# Patient Record
Sex: MALE | Race: WHITE | Employment: OTHER | URBAN - METROPOLITAN AREA
[De-identification: names, ages, dates, MRNs, and addresses within clinical notes are randomized per-mention and may not be internally consistent; named-entity substitution may affect disease eponyms.]

---

## 2020-07-18 ENCOUNTER — HOSPITAL ENCOUNTER (OUTPATIENT)
Dept: LAB | Age: 60
Discharge: HOME | End: 2020-07-18
Payer: OTHER GOVERNMENT

## 2020-07-18 DIAGNOSIS — U07.1 ASYMPTOMATIC COVID-19 VIRUS INFECTION: ICD-10-CM

## 2020-07-18 PROCEDURE — 87635 SARS-COV-2 COVID-19 AMP PRB: CPT

## 2020-07-20 LAB — SARS-COV-2, COV2NT: NOT DETECTED

## 2020-09-21 ENCOUNTER — HOSPITAL ENCOUNTER (OUTPATIENT)
Dept: LAB | Age: 60
Discharge: HOME | End: 2020-09-21
Payer: OTHER GOVERNMENT

## 2020-09-21 DIAGNOSIS — Z01.812 PRE-PROCEDURAL LABORATORY EXAMINATIONS: ICD-10-CM

## 2020-09-21 PROCEDURE — 87635 SARS-COV-2 COVID-19 AMP PRB: CPT

## 2020-09-22 LAB — SARS-COV-2, COV2NT: NOT DETECTED

## 2020-09-22 RX ORDER — LISINOPRIL 40 MG/1
40 TABLET ORAL DAILY
COMMUNITY
End: 2022-05-29

## 2020-09-22 RX ORDER — TRIAMTERENE AND HYDROCHLOROTHIAZIDE 75; 50 MG/1; MG/1
1 TABLET ORAL DAILY
COMMUNITY
End: 2022-05-29

## 2020-09-22 RX ORDER — ASPIRIN 81 MG/1
81 TABLET ORAL DAILY
COMMUNITY

## 2020-09-22 NOTE — PROGRESS NOTES
[address] [place] [address] Dr Orozco Preprocedure Instructions      1. On the day of your surgery, please report to registration located on the 2nd floor of the  [facility]. yes    2. You must have a responsible adult to drive you to the hospital, stay at the hospital during your procedure and drive you home. If they leave your procedure will not be started (no exceptions). yes    3. Do not have anything to eat or drink (including water, gum, mints, coffee, and juice) after midnight. This does not apply to the medications you were instructed to take by your physician. yesIf you are currently taking Plavix, Coumadin, Aspirin, or other blood-thinning agents, contact your physician for special instructions. Yes,81mg ASA    4. If you are having a procedure that requires bowel prep: We recommend that you have only clear liquids the day before your procedure and begin your bowel prep by 5:00 pm.  You may continue to drink clear liquids until midnight. If for any reason you are not able to complete your prep please notify your physician immediately. yes    5. Have a list of all current medications, including vitamins, herbal supplements and any other over the counter medications. yes    6. If you wear glasses, contacts, dentures and/or hearing aids, they may be removed prior to procedure, please bring a case to store them in. yes    7. You should understand that if you do not follow these instructions your procedure may be cancelled. If your physical condition changes (I.e. fever, cold or flu) please contact your doctor as soon as possible. 8. It is important that you be on time.   If for any reason you are unable to keep your appointment please call )- the day of or your physicians office prior to your scheduled procedure

## 2020-09-25 ENCOUNTER — ANESTHESIA EVENT (OUTPATIENT)
Dept: ENDOSCOPY | Age: 60
End: 2020-09-25
Payer: OTHER GOVERNMENT

## 2020-09-25 ENCOUNTER — HOSPITAL ENCOUNTER (OUTPATIENT)
Age: 60
Setting detail: OUTPATIENT SURGERY
Discharge: HOME | End: 2020-09-25
Attending: SPECIALIST | Admitting: SPECIALIST
Payer: OTHER GOVERNMENT

## 2020-09-25 ENCOUNTER — ANESTHESIA (OUTPATIENT)
Dept: ENDOSCOPY | Age: 60
End: 2020-09-25
Payer: OTHER GOVERNMENT

## 2020-09-25 VITALS
BODY MASS INDEX: 35.34 KG/M2 | DIASTOLIC BLOOD PRESSURE: 84 MMHG | HEIGHT: 71 IN | OXYGEN SATURATION: 97 % | RESPIRATION RATE: 17 BRPM | HEART RATE: 83 BPM | WEIGHT: 252.43 LBS | TEMPERATURE: 98.1 F | SYSTOLIC BLOOD PRESSURE: 117 MMHG

## 2020-09-25 PROCEDURE — 76040000019: Performed by: SPECIALIST

## 2020-09-25 PROCEDURE — 74011250636 HC RX REV CODE- 250/636: Performed by: NURSE ANESTHETIST, CERTIFIED REGISTERED

## 2020-09-25 PROCEDURE — 88305 TISSUE EXAM BY PATHOLOGIST: CPT

## 2020-09-25 PROCEDURE — 76060000031 HC ANESTHESIA FIRST 0.5 HR: Performed by: SPECIALIST

## 2020-09-25 PROCEDURE — 2709999900 HC NON-CHARGEABLE SUPPLY: Performed by: SPECIALIST

## 2020-09-25 PROCEDURE — 77030013992 HC SNR POLYP ENDOSC BSC -B: Performed by: SPECIALIST

## 2020-09-25 RX ORDER — FENTANYL CITRATE 50 UG/ML
25 INJECTION, SOLUTION INTRAMUSCULAR; INTRAVENOUS AS NEEDED
Status: DISCONTINUED | OUTPATIENT
Start: 2020-09-25 | End: 2020-09-25 | Stop reason: HOSPADM

## 2020-09-25 RX ORDER — FLUMAZENIL 0.1 MG/ML
0.2 INJECTION INTRAVENOUS
Status: DISCONTINUED | OUTPATIENT
Start: 2020-09-25 | End: 2020-09-25 | Stop reason: HOSPADM

## 2020-09-25 RX ORDER — PROPOFOL 10 MG/ML
INJECTION, EMULSION INTRAVENOUS
Status: DISCONTINUED | OUTPATIENT
Start: 2020-09-25 | End: 2020-09-25 | Stop reason: HOSPADM

## 2020-09-25 RX ORDER — DEXTROMETHORPHAN/PSEUDOEPHED 2.5-7.5/.8
1.2 DROPS ORAL
Status: DISCONTINUED | OUTPATIENT
Start: 2020-09-25 | End: 2020-09-25 | Stop reason: HOSPADM

## 2020-09-25 RX ORDER — NALOXONE HYDROCHLORIDE 0.4 MG/ML
0.4 INJECTION, SOLUTION INTRAMUSCULAR; INTRAVENOUS; SUBCUTANEOUS
Status: DISCONTINUED | OUTPATIENT
Start: 2020-09-25 | End: 2020-09-25 | Stop reason: HOSPADM

## 2020-09-25 RX ORDER — MIDAZOLAM HYDROCHLORIDE 1 MG/ML
.25-5 INJECTION, SOLUTION INTRAMUSCULAR; INTRAVENOUS AS NEEDED
Status: DISCONTINUED | OUTPATIENT
Start: 2020-09-25 | End: 2020-09-25 | Stop reason: HOSPADM

## 2020-09-25 RX ORDER — SODIUM CHLORIDE 9 MG/ML
INJECTION, SOLUTION INTRAVENOUS
Status: DISCONTINUED | OUTPATIENT
Start: 2020-09-25 | End: 2020-09-25 | Stop reason: HOSPADM

## 2020-09-25 RX ORDER — SODIUM CHLORIDE 9 MG/ML
50 INJECTION, SOLUTION INTRAVENOUS CONTINUOUS
Status: DISCONTINUED | OUTPATIENT
Start: 2020-09-25 | End: 2020-09-25 | Stop reason: HOSPADM

## 2020-09-25 RX ADMIN — SODIUM CHLORIDE: 9 INJECTION, SOLUTION INTRAVENOUS at 07:36

## 2020-09-25 RX ADMIN — PROPOFOL 200 MCG/KG/MIN: 10 INJECTION, EMULSION INTRAVENOUS at 07:50

## 2020-09-25 NOTE — H&P
63 y.o. male for open access colonoscopy for screening   Additional data for completion of the targeted pre-endoscopy H&P will be provided under 'H&P interval notes'. Please see that document which will be attached to this.   Cassandra Johnson MD

## 2020-09-25 NOTE — ROUTINE PROCESS
Perfecto López  1960  [id number]    Situation:  Verbal report received from: Rutherford Opitz  Procedure: Procedure(s):  COLONOSCOPY  ENDOSCOPIC POLYPECTOMY    Background:    Preoperative diagnosis: FAMILY HISTORY COLON CANCER, PERSONAL HISTORY COLON POLYPS  Postoperative diagnosis: Ascending colon polyp  Rectal polyp  descending polyp    :  Dr. Melissa Arauz  Assistant(s): Endoscopy Technician-1: Ej Nunez  Endoscopy RN-1: Hermelinda Platt RN    Specimens:   ID Type Source Tests Collected by Time Destination   1 : polyp Preservative Colon, Ascending  Merary Faye MD 9/25/2020 3418 Pathology   2 : polyp x2 Preservative Colon, Descending  Merary Faye MD 9/25/2020 0585 Pathology   3 : polyp Preservative Rectum  Merary Faye MD 9/25/2020 1346 Pathology     H. Pylori  no    Assessment:  Intra-procedure medications     Anesthesia gave intra-procedure sedation and medications, see anesthesia flow sheet yes    Intravenous fluids: NS@ KVO     Vital signs stable     Abdominal assessment: round and soft     Recommendation:  Discharge patient per MD order.   Family wife  Permission to share finding with family or friend yes

## 2020-09-25 NOTE — ROUTINE PROCESS
Endoscopy discharge instructions have been reviewed and given to patient. The patient verbalized understanding and acceptance of instructions. Dr. Frank Medina discussed with spouse procedure findings and next steps.

## 2020-09-25 NOTE — ANESTHESIA PREPROCEDURE EVALUATION
Relevant Problems   No relevant active problems       Anesthetic History   No history of anesthetic complications            Review of Systems / Medical History  Patient summary reviewed, nursing notes reviewed and pertinent labs reviewed    Pulmonary  Within defined limits            Pertinent negatives: No asthma and recent URI     Neuro/Psych   Within defined limits           Cardiovascular    Hypertension: well controlled              Exercise tolerance: >4 METS     GI/Hepatic/Renal  Within defined limits              Endo/Other  Within defined limits           Other Findings              Physical Exam    Airway  Mallampati: II  TM Distance: 4 - 6 cm  Neck ROM: normal range of motion   Mouth opening: Diminished (comment)     Cardiovascular  Regular rate and rhythm,  S1 and S2 normal,  no murmur, click, rub, or gallop             Dental    Dentition: Lower dentition intact and Upper dentition intact     Pulmonary  Breath sounds clear to auscultation               Abdominal         Other Findings            Anesthetic Plan    ASA: 2  Anesthesia type: MAC            Anesthetic plan and risks discussed with: Patient

## 2020-09-25 NOTE — ANESTHESIA POSTPROCEDURE EVALUATION
Procedure(s):  COLONOSCOPY  ENDOSCOPIC POLYPECTOMY.     MAC    Anesthesia Post Evaluation      Multimodal analgesia: multimodal analgesia used between 6 hours prior to anesthesia start to PACU discharge  Patient location during evaluation: PACU  Patient participation: complete - patient participated  Level of consciousness: awake and alert  Pain management: adequate  Airway patency: patent  Anesthetic complications: no  Cardiovascular status: acceptable  Respiratory status: acceptable  Hydration status: acceptable  Post anesthesia nausea and vomiting:  none      INITIAL Post-op Vital signs:   Vitals Value Taken Time   /77 9/25/2020  8:10 AM   Temp     Pulse 83 9/25/2020  8:10 AM   Resp 16 9/25/2020  8:10 AM   SpO2 96 % 9/25/2020  8:10 AM

## 2020-09-25 NOTE — DISCHARGE INSTRUCTIONS
[address] [facility] DARRION Maldonado MD  [phone]      September 25, 2020    Shaunna Cornelius  YOB: 1960    COLONOSCOPY DISCHARGE INSTRUCTIONS    If there is redness at IV site you should apply warm compress to area. If redness or soreness persist contact Dr. Michael Maldonado' or your primary care doctor. There may be a slight amount of blood passed from the rectum. Gaseous discomfort may develop, but walking, belching will help relieve this. You may not operate a vehicle for 12 hours  You may not operate machinery or dangerous appliances for rest of today  You may not drink alcoholic beverages for 12 hours  Avoid making any critical decisions for 24 hours    DIET:  You may resume your normal diet, but some patients find that heavy or large meals may lead to indigestion or vomiting. I suggest a light meal as first food intake. MEDICATIONS:  The use of some over-the-counter pain medication may lead to bleeding after colon biopsies or polyp removal.  Tylenol (also called acetaminophen) is safe to take even if you have had colonoscopy with polyp removal.  Based on the procedure you had today you may not safely take aspirin or aspirin-like products for the next ten (10) days. Remember that Tylenol (also called acetaminophen) is safe to take after colonoscopy even if you have had biopsies or polyps removed. ACTIVITY:  You may resume your normal household activities, but it is recommended that you spend the remainder of the day resting -  avoid any strenuous activity. CALL DR. Matheus Ayoub' OFFICE IF:  Increasing pain, nausea, vomiting  Abdominal distension (swelling)  Significant new or increased bleeding (oral or rectal)  Fever/Chills  Chest pain/shortness of breath                       Additional instructions: We found four small polyps and removed them all.   I will contact you with the polyp results by letter in about a week.  No aspirin 10 days to minimize risk for bleeding. It was an honor to be your doctor today. Please let me or my office staff know if you have any feedback about today's procedure. Cassandra Johnson MD    Colonoscopy saves lives, and can prevent colon cancer. Everyone aged 48 or older needs colonoscopy.   Tell your family and friends: get the test!

## 2020-09-25 NOTE — PROCEDURES
[address] [facility] DARRION Christiansen MD  [phone]      2020    Colonoscopy Procedure Note  Kavitha Garduno  :  1960  [facility] Medical Record Number: [id number]    Indications:     Family history of coloretal cancer (screening only), Personal history of colon polyps (screening only), Screening colonoscopy  PCP:  Og, MD Elieser  Anesthesia/Sedation: Conscious Sedation/Moderate Sedation/GETA, see notes  Endoscopist:  Dr. Marco Antonio Duarte  Complications:  None  Estimated Blood Loss:  None    Permit:  The indications, risks, benefits and alternatives were reviewed with the patient or their decision maker who was provided an opportunity to ask questions and all questions were answered. The specific risks of colonoscopy with conscious sedation were reviewed, including but not limited to anesthetic complication, bleeding, adverse drug reaction, missed lesion, infection, IV site reactions, and intestinal perforation which would lead to the need for surgical repair. Alternatives to colonoscopy including radiographic imaging, observation without testing, or laboratory testing were reviewed including the limitations of those alternatives. After considering the options and having all their questions answered, the patient or their decision maker provided both verbal and written consent to proceed. Procedure in Detail:  After obtaining informed consent, positioning of the patient in the left lateral decubitus position, and conduction of a pre-procedure pause or \"time out\" the endoscope was introduced into the anus and advanced to the cecum, which was identified by the ileocecal valve and appendiceal orifice. The quality of the colonic preparation was good. A careful inspection was made as the colonoscope was withdrawn, findings and interventions are described below.     Findings:   Four polyps today, each taken with cold snare, retrieved and hemostasis confirmed. Ascending 4mm  Descending 6mm, 4mm  Rectum 4mm        Specimens:    See above    Complications:   None; patient tolerated the procedure well. Impression:  Colon polyps four. Recommendations:     - Await pathology. Thank you for entrusting me with this patient's care. Please do not hesitate to contact me with any questions or if I can be of assistance with any of your other patients' GI needs. Signed By: Tess Ordaz MD                        September 25, 2020      Surgical assistant none. Implants none unless specified.

## 2020-09-25 NOTE — PERIOP NOTES
Received report from [address] [place] [address], see anesthesia note. Scopes all washed at bedside by Ric Del Cid. Pt transferred to recovery and report given to [facility] regarding procedures, diagnosis, etc. Family updated by phone on POC by Dr. Percy Gowers. VSS, abdoman soft.

## 2020-09-25 NOTE — INTERVAL H&P NOTE
Pre-Endoscopy H&P Update  Chief complaint/HPI/ROS:  The indication for the procedure, the patient's history and the patient's current medications are reviewed prior to the procedure and that data is reported on the H&P to which this document is attached. Any significant complaints with regard to organ systems will be noted, and if not mentioned then a review of systems is not contributory. Past Medical History:   Diagnosis Date    Hypertension       Past Surgical History:   Procedure Laterality Date    HX ORTHOPAEDIC      lower back surgery     Social   Social History     Tobacco Use    Smoking status: Former Smoker    Smokeless tobacco: Never Used   Substance Use Topics    Alcohol use: Yes     Alcohol/week: 6.0 - 7.0 standard drinks     Types: 6 - 7 Glasses of wine per week      Family History   Problem Relation Age of Onset    Stroke Mother     Cancer Father       No Known Allergies   Prior to Admission Medications   Prescriptions Last Dose Informant Patient Reported? Taking?   aspirin delayed-release 81 mg tablet 9/18/2020 at Unknown time  Yes Yes   Sig: Take 81 mg by mouth daily. lisinopriL (PRINIVIL, ZESTRIL) 40 mg tablet 9/25/2020 at Unknown time  Yes Yes   Sig: Take 40 mg by mouth daily. triamterene-hydroCHLOROthiazide (MAXZIDE) 75-50 mg per tablet 9/24/2020 at Unknown time  Yes Yes   Sig: Take 1 Tab by mouth daily. Facility-Administered Medications: None       PHYSICAL EXAM:  The patient is examined immediately prior to the procedure. Visit Vitals  BP (!) 149/86   Pulse 79   Temp 98.6 °F (37 °C)   Resp 16   Ht 5' 11\" (1.803 m)   Wt 114.5 kg (252 lb 6.8 oz)   SpO2 99%   BMI 35.21 kg/m²     Gen: Appears comfortable, no distress. Pulm: comfortable respirations with no abnormal audible breath sounds  HEART: well perfused, no abnormal audible heart sounds  GI: abdomen flat. PLAN:  Informed consent discussion held, patient afforded an opportunity to ask questions and all questions answered. After being advised of the risks, benefits, and alternatives, the patient requested that we proceed and indicated so on a written consent form. Will proceed with procedure as planned.   Gorge Donovan MD

## 2022-05-27 ENCOUNTER — APPOINTMENT (OUTPATIENT)
Dept: CT IMAGING | Age: 62
End: 2022-05-27
Attending: EMERGENCY MEDICINE
Payer: OTHER GOVERNMENT

## 2022-05-27 ENCOUNTER — APPOINTMENT (OUTPATIENT)
Dept: GENERAL RADIOLOGY | Age: 62
End: 2022-05-27
Attending: EMERGENCY MEDICINE
Payer: OTHER GOVERNMENT

## 2022-05-27 ENCOUNTER — HOSPITAL ENCOUNTER (EMERGENCY)
Age: 62
Discharge: HOME | End: 2022-05-27
Attending: EMERGENCY MEDICINE
Payer: OTHER GOVERNMENT

## 2022-05-27 VITALS
SYSTOLIC BLOOD PRESSURE: 133 MMHG | HEIGHT: 71 IN | BODY MASS INDEX: 35.7 KG/M2 | OXYGEN SATURATION: 96 % | RESPIRATION RATE: 28 BRPM | DIASTOLIC BLOOD PRESSURE: 73 MMHG | HEART RATE: 67 BPM | TEMPERATURE: 98.8 F | WEIGHT: 255 LBS

## 2022-05-27 DIAGNOSIS — R51.9 ACUTE NONINTRACTABLE HEADACHE, UNSPECIFIED HEADACHE TYPE: Primary | ICD-10-CM

## 2022-05-27 DIAGNOSIS — I10 HYPERTENSION, UNSPECIFIED TYPE: ICD-10-CM

## 2022-05-27 DIAGNOSIS — R06.02 SOB (SHORTNESS OF BREATH): ICD-10-CM

## 2022-05-27 LAB
ALBUMIN SERPL-MCNC: 3.1 G/DL (ref 3.5–5)
ALBUMIN/GLOB SERPL: 0.9 {RATIO} (ref 1.1–2.2)
ALP SERPL-CCNC: 54 U/L (ref 45–117)
ALT SERPL-CCNC: 34 U/L (ref 12–78)
ANION GAP SERPL CALC-SCNC: 7 MMOL/L (ref 5–15)
AST SERPL-CCNC: 17 U/L (ref 15–37)
ATRIAL RATE: 86 BPM
BASOPHILS # BLD: 0.1 K/UL (ref 0–0.1)
BASOPHILS NFR BLD: 1 % (ref 0–1)
BILIRUB SERPL-MCNC: 0.3 MG/DL (ref 0.2–1)
BNP SERPL-MCNC: 376 PG/ML
BUN SERPL-MCNC: 18 MG/DL (ref 6–20)
BUN/CREAT SERPL: 17 (ref 12–20)
CALCIUM SERPL-MCNC: 8.9 MG/DL (ref 8.5–10.1)
CALCULATED P AXIS, ECG09: 48 DEGREES
CALCULATED R AXIS, ECG10: -73 DEGREES
CALCULATED T AXIS, ECG11: 31 DEGREES
CHLORIDE SERPL-SCNC: 111 MMOL/L (ref 97–108)
CO2 SERPL-SCNC: 26 MMOL/L (ref 21–32)
CREAT SERPL-MCNC: 1.05 MG/DL (ref 0.7–1.3)
DIAGNOSIS, 93000: NORMAL
DIFFERENTIAL METHOD BLD: ABNORMAL
EOSINOPHIL # BLD: 0.2 K/UL (ref 0–0.4)
EOSINOPHIL NFR BLD: 1 % (ref 0–7)
ERYTHROCYTE [DISTWIDTH] IN BLOOD BY AUTOMATED COUNT: 12.8 % (ref 11.5–14.5)
GLOBULIN SER CALC-MCNC: 3.4 G/DL (ref 2–4)
GLUCOSE SERPL-MCNC: 119 MG/DL (ref 65–100)
HCT VFR BLD AUTO: 38.4 % (ref 36.6–50.3)
HGB BLD-MCNC: 13.1 G/DL (ref 12.1–17)
IMM GRANULOCYTES # BLD AUTO: 0.1 K/UL (ref 0–0.04)
IMM GRANULOCYTES NFR BLD AUTO: 1 % (ref 0–0.5)
LYMPHOCYTES # BLD: 1.6 K/UL (ref 0.8–3.5)
LYMPHOCYTES NFR BLD: 15 % (ref 12–49)
MCH RBC QN AUTO: 33.1 PG (ref 26–34)
MCHC RBC AUTO-ENTMCNC: 34.1 G/DL (ref 30–36.5)
MCV RBC AUTO: 97 FL (ref 80–99)
MONOCYTES # BLD: 0.9 K/UL (ref 0–1)
MONOCYTES NFR BLD: 9 % (ref 5–13)
NEUTS SEG # BLD: 7.7 K/UL (ref 1.8–8)
NEUTS SEG NFR BLD: 73 % (ref 32–75)
NRBC # BLD: 0 K/UL (ref 0–0.01)
NRBC BLD-RTO: 0 PER 100 WBC
P-R INTERVAL, ECG05: 170 MS
PLATELET # BLD AUTO: 235 K/UL (ref 150–400)
PMV BLD AUTO: 10.7 FL (ref 8.9–12.9)
POTASSIUM SERPL-SCNC: 4.1 MMOL/L (ref 3.5–5.1)
PROT SERPL-MCNC: 6.5 G/DL (ref 6.4–8.2)
Q-T INTERVAL, ECG07: 368 MS
QRS DURATION, ECG06: 96 MS
QTC CALCULATION (BEZET), ECG08: 440 MS
RBC # BLD AUTO: 3.96 M/UL (ref 4.1–5.7)
SODIUM SERPL-SCNC: 144 MMOL/L (ref 136–145)
TROPONIN-HIGH SENSITIVITY: 6 NG/L (ref 0–76)
TROPONIN-HIGH SENSITIVITY: 8 NG/L (ref 0–76)
VENTRICULAR RATE, ECG03: 86 BPM
WBC # BLD AUTO: 10.6 K/UL (ref 4.1–11.1)

## 2022-05-27 PROCEDURE — 83880 ASSAY OF NATRIURETIC PEPTIDE: CPT

## 2022-05-27 PROCEDURE — 84484 ASSAY OF TROPONIN QUANT: CPT

## 2022-05-27 PROCEDURE — 71046 X-RAY EXAM CHEST 2 VIEWS: CPT

## 2022-05-27 PROCEDURE — 93005 ELECTROCARDIOGRAM TRACING: CPT

## 2022-05-27 PROCEDURE — 70450 CT HEAD/BRAIN W/O DYE: CPT

## 2022-05-27 PROCEDURE — 36415 COLL VENOUS BLD VENIPUNCTURE: CPT

## 2022-05-27 PROCEDURE — 85025 COMPLETE CBC W/AUTO DIFF WBC: CPT

## 2022-05-27 PROCEDURE — 80053 COMPREHEN METABOLIC PANEL: CPT

## 2022-05-27 PROCEDURE — 99285 EMERGENCY DEPT VISIT HI MDM: CPT

## 2022-05-27 NOTE — ED TRIAGE NOTES
Pt. States woke up tonight with severe headache and his BP was elevated. 180's at home. Was just seen by cardiologist last week, states have been adjusting dose of BP medications over the last week or so.

## 2022-05-27 NOTE — ED PROVIDER NOTES
HPI     Please note that this dictation was completed with Noquo, the computer voice recognition software. Quite often unanticipated grammatical, syntax, homophones, and other interpretive errors are inadvertently transcribed by the computer software. Please disregard these errors. Please excuse any errors that have escaped final proofreading. 27-year-old male with a history of hypertension currently undergoing work-up by cardiologist for shortness of breath here with headache and awakened with shortness of breath. Patient states he had a calcium score test done yesterday and due for an exercise stress test.  They have been changing around his blood pressure medicines. He had a headache when he awoke tonight and felt somewhat short of breath. He also had sweating earlier. Denies any chest pain, leg pain or swelling, fevers or other complaints. Headache has improved. No longer sweating. Past Medical History:   Diagnosis Date    Hypertension        Past Surgical History:   Procedure Laterality Date    COLONOSCOPY N/A 9/25/2020    COLONOSCOPY performed by Chiqui Monahan MD at [address] [place] [address] ORTHOPAEDIC      lower back surgery         Family History:   Problem Relation Age of Onset    Stroke Mother     Cancer Father        Social History     Socioeconomic History    Marital status:      Spouse name: Not on file    Number of children: Not on file    Years of education: Not on file    Highest education level: Not on file   Occupational History    Not on file   Tobacco Use    Smoking status: Former Smoker    Smokeless tobacco: Never Used   Substance and Sexual Activity    Alcohol use:  Yes     Alcohol/week: 6.0 - 7.0 standard drinks     Types: 6 - 7 Glasses of wine per week    Drug use: Never    Sexual activity: Not on file   Other Topics Concern    Not on file   Social History Narrative    Not on file     Social Determinants of Health     Financial Resource Strain:     Difficulty of Paying Living Expenses: Not on file   Food Insecurity:     Worried About Running Out of Food in the Last Year: Not on file    Ran Out of Food in the Last Year: Not on file   Transportation Needs:     Lack of Transportation (Medical): Not on file    Lack of Transportation (Non-Medical): Not on file   Physical Activity:     Days of Exercise per Week: Not on file    Minutes of Exercise per Session: Not on file   Stress:     Feeling of Stress : Not on file   Social Connections:     Frequency of Communication with Friends and Family: Not on file    Frequency of Social Gatherings with Friends and Family: Not on file    Attends Orthodoxy Services: Not on file    Active Member of [address] [place] [address] [employer] or Organizations: Not on file    Attends Club or Organization Meetings: Not on file    Marital Status: Not on file   Intimate Partner Violence:     Fear of Current or Ex-Partner: Not on file    Emotionally Abused: Not on file    Physically Abused: Not on file    Sexually Abused: Not on file   Housing Stability:     Unable to Pay for Housing in the Last Year: Not on file    Number of [place] in the Last Year: Not on file    Unstable Housing in the Last Year: Not on file         ALLERGIES: Patient has no known allergies. Review of Systems   Constitutional: Positive for diaphoresis. Negative for chills and fever. HENT: Negative for congestion. Respiratory: Positive for shortness of breath. Cardiovascular: Negative for chest pain. Neurological: Positive for headaches. Negative for numbness. All other systems reviewed and are negative. Vitals:    05/27/22 0033 05/27/22 0057 05/27/22 0058 05/27/22 0106   BP: (!) 192/112  (!) 151/73    Pulse:   92    Resp: 18  17    Temp: 98.8 °F (37.1 °C)      SpO2: 97%  97% 97%   Weight: 115.7 kg (255 lb)      Height:  5' 11\" (1.803 m)              Physical Exam  Vitals and nursing note reviewed.    Constitutional:       General: He is not in acute distress. Appearance: He is well-developed. He is not ill-appearing. HENT:      Head: Normocephalic and atraumatic. Nose: No congestion or rhinorrhea. Mouth/Throat:      Pharynx: No oropharyngeal exudate. Eyes:      General: No scleral icterus. Right eye: No discharge. Left eye: No discharge. Conjunctiva/sclera: Conjunctivae normal.      Pupils: Pupils are equal, round, and reactive to light. Cardiovascular:      Rate and Rhythm: Normal rate and regular rhythm. Heart sounds: Normal heart sounds. No murmur heard. No friction rub. No gallop. Pulmonary:      Effort: Pulmonary effort is normal. No respiratory distress. Breath sounds: Rales (slight crackles at bases) present. No wheezing. Abdominal:      General: Bowel sounds are normal. There is no distension. Palpations: Abdomen is soft. Tenderness: There is no abdominal tenderness. There is no guarding. Musculoskeletal:         General: No tenderness. Normal range of motion. Cervical back: Normal range of motion and neck supple. Lymphadenopathy:      Cervical: No cervical adenopathy. Skin:     General: Skin is warm and dry. Coloration: Skin is not pale. Findings: No rash. Neurological:      Mental Status: He is alert and oriented to person, place, and time. Cranial Nerves: No cranial nerve deficit. Coordination: Coordination normal.          [facility]  ED Course as of 05/27/22 0519   Fri May 27, 2022   0421 Repeating troponin and reviewed all results with patient. He remains asymptomatic at this point. [RG]      ED Course User Index  [RG] Rhonda Richardson MD     58-year-old male here with episode of headache and diaphoresis and associated shortness of breath tonight. Currently under going a work-up by cardiology for shortness of breath. Differential diagnosis includes intracranial hemorrhage, hypertension related headache, CHF, emboli and others.   Check chest x-ray, head CT, labs. Procedures      ED EKG interpretation:  Rhythm: normal sinus rhythm; and regular . Rate (approx.): 86; Axis: normal; P wave: normal; QRS interval: normal ; ST/T wave: non-specific changes; . This EKG was interpreted by Elpidio Puga MD,ED Provider. 5:20 AM  Repeat trop negative. 5:20 AM  Patient's results have been reviewed with them. Patient and/or family have verbally conveyed their understanding and agreement of the patient's signs, symptoms, diagnosis, treatment and prognosis and additionally agree to follow up as recommended or return to the Emergency Room should their condition change prior to follow-up. Discharge instructions have also been provided to the patient with some educational information regarding their diagnosis as well a list of reasons why they would want to return to the ER prior to their follow-up appointment should their condition change. Recent Results (from the past 24 hour(s))   CBC WITH AUTOMATED DIFF    Collection Time: 05/27/22  3:04 AM   Result Value Ref Range    WBC 10.6 4.1 - 11.1 K/uL    RBC 3.96 (L) 4.10 - 5.70 M/uL    HGB 13.1 12.1 - 17.0 g/dL    HCT 38.4 36.6 - 50.3 %    MCV 97.0 80.0 - 99.0 FL    MCH 33.1 26.0 - 34.0 PG    MCHC 34.1 30.0 - 36.5 g/dL    RDW 12.8 11.5 - 14.5 %    PLATELET 194 216 - 324 K/uL    MPV 10.7 8.9 - 12.9 FL    NRBC 0.0 0  WBC    ABSOLUTE NRBC 0.00 0.00 - 0.01 K/uL    NEUTROPHILS 73 32 - 75 %    LYMPHOCYTES 15 12 - 49 %    MONOCYTES 9 5 - 13 %    EOSINOPHILS 1 0 - 7 %    BASOPHILS 1 0 - 1 %    IMMATURE GRANULOCYTES 1 (H) 0.0 - 0.5 %    ABS. NEUTROPHILS 7.7 1.8 - 8.0 K/UL    ABS. LYMPHOCYTES 1.6 0.8 - 3.5 K/UL    ABS. MONOCYTES 0.9 0.0 - 1.0 K/UL    ABS. EOSINOPHILS 0.2 0.0 - 0.4 K/UL    ABS. BASOPHILS 0.1 0.0 - 0.1 K/UL    ABS. IMM.  GRANS. 0.1 (H) 0.00 - 0.04 K/UL    DF AUTOMATED     METABOLIC PANEL, COMPREHENSIVE    Collection Time: 05/27/22  3:04 AM   Result Value Ref Range    Sodium 144 136 - 145 mmol/L Potassium 4.1 3.5 - 5.1 mmol/L    Chloride 111 (H) 97 - 108 mmol/L    CO2 26 21 - 32 mmol/L    Anion gap 7 5 - 15 mmol/L    Glucose 119 (H) 65 - 100 mg/dL    BUN 18 6 - 20 MG/DL    Creatinine 1.05 0.70 - 1.30 MG/DL    BUN/Creatinine ratio 17 12 - 20      GFR est AA >60 >60 ml/min/1.73m2    GFR est non-AA >60 >60 ml/min/1.73m2    Calcium 8.9 8.5 - 10.1 MG/DL    Bilirubin, total 0.3 0.2 - 1.0 MG/DL    ALT (SGPT) 34 12 - 78 U/L    AST (SGOT) 17 15 - 37 U/L    Alk. phosphatase 54 45 - 117 U/L    Protein, total 6.5 6.4 - 8.2 g/dL    Albumin 3.1 (L) 3.5 - 5.0 g/dL    Globulin 3.4 2.0 - 4.0 g/dL    A-G Ratio 0.9 (L) 1.1 - 2.2     TROPONIN-HIGH SENSITIVITY    Collection Time: 05/27/22  3:04 AM   Result Value Ref Range    Troponin-High Sensitivity 8 0 - 76 ng/L   NT-PRO BNP    Collection Time: 05/27/22  3:04 AM   Result Value Ref Range    NT pro- (H) <125 PG/ML   TROPONIN-HIGH SENSITIVITY    Collection Time: 05/27/22  4:33 AM   Result Value Ref Range    Troponin-High Sensitivity 6 0 - 76 ng/L       XR CHEST PA LAT    Result Date: 5/27/2022  Clinical history: sob INDICATION:   sob COMPARISON: 2008 FINDINGS: PA and lateral views of the chest are obtained. The cardiopericardial silhouette is within normal limits. There is no pleural effusion, pneumothorax or focal consolidation present. No acute intrathoracic disease. CT HEAD WO CONT    Result Date: 5/27/2022  CLINICAL HISTORY: HA and HTN INDICATION: HA and HTN COMPARISON: None. CT dose reduction was achieved through use of a standardized protocol tailored for this examination and automatic exposure control for dose modulation. TECHNIQUE: Serial axial images with a collimation of 5 mm were obtained from the skull base through the vertex  FINDINGS: The sulci and ventricles are within normal limits for patient age. There is no evidence of an acute infarction, hemorrhage, or mass-effect. There is no evidence of midline shift or hydrocephalus.  Posterior fossa structures are unremarkable. No extra-axial collections are seen. Mastoid air cells are well pneumatized and clear. There is no evidence of depressed skull fractures of soft tissue swelling. No acute intracranial process.

## 2022-05-28 ENCOUNTER — APPOINTMENT (OUTPATIENT)
Dept: CT IMAGING | Age: 62
End: 2022-05-28
Attending: STUDENT IN AN ORGANIZED HEALTH CARE EDUCATION/TRAINING PROGRAM
Payer: OTHER GOVERNMENT

## 2022-05-28 ENCOUNTER — HOSPITAL ENCOUNTER (OUTPATIENT)
Age: 62
Setting detail: OBSERVATION
Discharge: HOME | End: 2022-05-29
Attending: STUDENT IN AN ORGANIZED HEALTH CARE EDUCATION/TRAINING PROGRAM | Admitting: INTERNAL MEDICINE
Payer: OTHER GOVERNMENT

## 2022-05-28 ENCOUNTER — APPOINTMENT (OUTPATIENT)
Dept: GENERAL RADIOLOGY | Age: 62
End: 2022-05-28
Attending: STUDENT IN AN ORGANIZED HEALTH CARE EDUCATION/TRAINING PROGRAM
Payer: OTHER GOVERNMENT

## 2022-05-28 ENCOUNTER — APPOINTMENT (OUTPATIENT)
Dept: VASCULAR SURGERY | Age: 62
End: 2022-05-28
Attending: PSYCHIATRY & NEUROLOGY
Payer: OTHER GOVERNMENT

## 2022-05-28 ENCOUNTER — APPOINTMENT (OUTPATIENT)
Dept: MRI IMAGING | Age: 62
End: 2022-05-28
Attending: INTERNAL MEDICINE
Payer: OTHER GOVERNMENT

## 2022-05-28 DIAGNOSIS — I10 HYPERTENSION, UNSPECIFIED TYPE: ICD-10-CM

## 2022-05-28 DIAGNOSIS — H53.9 ABNORMAL VISION: ICD-10-CM

## 2022-05-28 DIAGNOSIS — I16.0 HYPERTENSIVE URGENCY: ICD-10-CM

## 2022-05-28 DIAGNOSIS — G45.9 TIA (TRANSIENT ISCHEMIC ATTACK): Primary | ICD-10-CM

## 2022-05-28 PROBLEM — R50.9 FEVER: Status: ACTIVE | Noted: 2022-05-28

## 2022-05-28 PROBLEM — D72.829 LEUKOCYTOSIS: Status: ACTIVE | Noted: 2022-05-28

## 2022-05-28 LAB
ALBUMIN SERPL-MCNC: 3.4 G/DL (ref 3.5–5)
ALBUMIN/GLOB SERPL: 0.9 {RATIO} (ref 1.1–2.2)
ALP SERPL-CCNC: 63 U/L (ref 45–117)
ALT SERPL-CCNC: 58 U/L (ref 12–78)
ANION GAP SERPL CALC-SCNC: 9 MMOL/L (ref 5–15)
APPEARANCE UR: CLEAR
AST SERPL-CCNC: 29 U/L (ref 15–37)
B PERT DNA SPEC QL NAA+PROBE: NOT DETECTED
BACTERIA URNS QL MICRO: NEGATIVE /HPF
BASOPHILS # BLD: 0.1 K/UL (ref 0–0.1)
BASOPHILS NFR BLD: 1 % (ref 0–1)
BILIRUB SERPL-MCNC: 0.7 MG/DL (ref 0.2–1)
BILIRUB UR QL: NEGATIVE
BNP SERPL-MCNC: 465 PG/ML
BORDETELLA PARAPERTUSSIS PCR, BORPAR: NOT DETECTED
BUN SERPL-MCNC: 17 MG/DL (ref 6–20)
BUN/CREAT SERPL: 14 (ref 12–20)
C PNEUM DNA SPEC QL NAA+PROBE: NOT DETECTED
CALCIUM SERPL-MCNC: 9.2 MG/DL (ref 8.5–10.1)
CHLORIDE SERPL-SCNC: 109 MMOL/L (ref 97–108)
CO2 SERPL-SCNC: 24 MMOL/L (ref 21–32)
COLOR UR: ABNORMAL
COMMENT, HOLDF: NORMAL
CREAT SERPL-MCNC: 1.25 MG/DL (ref 0.7–1.3)
DIFFERENTIAL METHOD BLD: ABNORMAL
EOSINOPHIL # BLD: 0.1 K/UL (ref 0–0.4)
EOSINOPHIL NFR BLD: 1 % (ref 0–7)
EPITH CASTS URNS QL MICRO: ABNORMAL /LPF
ERYTHROCYTE [DISTWIDTH] IN BLOOD BY AUTOMATED COUNT: 13 % (ref 11.5–14.5)
FLUAV H1 2009 PAND RNA SPEC QL NAA+PROBE: NOT DETECTED
FLUAV H1 RNA SPEC QL NAA+PROBE: NOT DETECTED
FLUAV H3 RNA SPEC QL NAA+PROBE: NOT DETECTED
FLUAV SUBTYP SPEC NAA+PROBE: NOT DETECTED
FLUBV RNA SPEC QL NAA+PROBE: NOT DETECTED
GLOBULIN SER CALC-MCNC: 3.7 G/DL (ref 2–4)
GLUCOSE BLD STRIP.AUTO-MCNC: 114 MG/DL (ref 65–117)
GLUCOSE SERPL-MCNC: 118 MG/DL (ref 65–100)
GLUCOSE UR STRIP.AUTO-MCNC: NEGATIVE MG/DL
HADV DNA SPEC QL NAA+PROBE: NOT DETECTED
HCOV 229E RNA SPEC QL NAA+PROBE: NOT DETECTED
HCOV HKU1 RNA SPEC QL NAA+PROBE: NOT DETECTED
HCOV NL63 RNA SPEC QL NAA+PROBE: NOT DETECTED
HCOV OC43 RNA SPEC QL NAA+PROBE: NOT DETECTED
HCT VFR BLD AUTO: 40.8 % (ref 36.6–50.3)
HGB BLD-MCNC: 13.7 G/DL (ref 12.1–17)
HGB UR QL STRIP: NEGATIVE
HMPV RNA SPEC QL NAA+PROBE: NOT DETECTED
HPIV1 RNA SPEC QL NAA+PROBE: NOT DETECTED
HPIV2 RNA SPEC QL NAA+PROBE: NOT DETECTED
HPIV3 RNA SPEC QL NAA+PROBE: NOT DETECTED
HPIV4 RNA SPEC QL NAA+PROBE: NOT DETECTED
HYALINE CASTS URNS QL MICRO: ABNORMAL /LPF (ref 0–2)
IMM GRANULOCYTES # BLD AUTO: 0.1 K/UL (ref 0–0.04)
IMM GRANULOCYTES NFR BLD AUTO: 0 % (ref 0–0.5)
KETONES UR QL STRIP.AUTO: NEGATIVE MG/DL
LEUKOCYTE ESTERASE UR QL STRIP.AUTO: ABNORMAL
LYMPHOCYTES # BLD: 2.1 K/UL (ref 0.8–3.5)
LYMPHOCYTES NFR BLD: 18 % (ref 12–49)
M PNEUMO DNA SPEC QL NAA+PROBE: NOT DETECTED
MAGNESIUM SERPL-MCNC: 2.3 MG/DL (ref 1.6–2.4)
MCH RBC QN AUTO: 32.3 PG (ref 26–34)
MCHC RBC AUTO-ENTMCNC: 33.6 G/DL (ref 30–36.5)
MCV RBC AUTO: 96.2 FL (ref 80–99)
MONOCYTES # BLD: 1 K/UL (ref 0–1)
MONOCYTES NFR BLD: 8 % (ref 5–13)
NEUTS SEG # BLD: 8.6 K/UL (ref 1.8–8)
NEUTS SEG NFR BLD: 72 % (ref 32–75)
NITRITE UR QL STRIP.AUTO: NEGATIVE
NRBC # BLD: 0 K/UL (ref 0–0.01)
NRBC BLD-RTO: 0 PER 100 WBC
PH UR STRIP: 6.5 [PH] (ref 5–8)
PLATELET # BLD AUTO: 245 K/UL (ref 150–400)
PMV BLD AUTO: 10.6 FL (ref 8.9–12.9)
POTASSIUM SERPL-SCNC: 3.6 MMOL/L (ref 3.5–5.1)
PROT SERPL-MCNC: 7.1 G/DL (ref 6.4–8.2)
PROT UR STRIP-MCNC: NEGATIVE MG/DL
RBC # BLD AUTO: 4.24 M/UL (ref 4.1–5.7)
RBC #/AREA URNS HPF: ABNORMAL /HPF (ref 0–5)
RSV RNA SPEC QL NAA+PROBE: NOT DETECTED
RV+EV RNA SPEC QL NAA+PROBE: NOT DETECTED
SAMPLES BEING HELD,HOLD: NORMAL
SARS-COV-2 PCR, COVPCR: NOT DETECTED
SERVICE CMNT-IMP: NORMAL
SODIUM SERPL-SCNC: 142 MMOL/L (ref 136–145)
SP GR UR REFRACTOMETRY: 1.01 (ref 1–1.03)
TROPONIN-HIGH SENSITIVITY: 8 NG/L (ref 0–76)
UA: UC IF INDICATED,UAUC: ABNORMAL
UROBILINOGEN UR QL STRIP.AUTO: 1 EU/DL (ref 0.2–1)
WBC # BLD AUTO: 11.9 K/UL (ref 4.1–11.1)
WBC URNS QL MICRO: ABNORMAL /HPF (ref 0–4)

## 2022-05-28 PROCEDURE — 74011250636 HC RX REV CODE- 250/636: Performed by: RADIOLOGY

## 2022-05-28 PROCEDURE — 70450 CT HEAD/BRAIN W/O DYE: CPT

## 2022-05-28 PROCEDURE — 93880 EXTRACRANIAL BILAT STUDY: CPT

## 2022-05-28 PROCEDURE — 71045 X-RAY EXAM CHEST 1 VIEW: CPT

## 2022-05-28 PROCEDURE — 82962 GLUCOSE BLOOD TEST: CPT

## 2022-05-28 PROCEDURE — 36415 COLL VENOUS BLD VENIPUNCTURE: CPT

## 2022-05-28 PROCEDURE — 99245 OFF/OP CONSLTJ NEW/EST HI 55: CPT | Performed by: PSYCHIATRY & NEUROLOGY

## 2022-05-28 PROCEDURE — 0202U NFCT DS 22 TRGT SARS-COV-2: CPT

## 2022-05-28 PROCEDURE — 93005 ELECTROCARDIOGRAM TRACING: CPT

## 2022-05-28 PROCEDURE — 99285 EMERGENCY DEPT VISIT HI MDM: CPT

## 2022-05-28 PROCEDURE — G0378 HOSPITAL OBSERVATION PER HR: HCPCS

## 2022-05-28 PROCEDURE — 85025 COMPLETE CBC W/AUTO DIFF WBC: CPT

## 2022-05-28 PROCEDURE — 80053 COMPREHEN METABOLIC PANEL: CPT

## 2022-05-28 PROCEDURE — 84484 ASSAY OF TROPONIN QUANT: CPT

## 2022-05-28 PROCEDURE — 81001 URINALYSIS AUTO W/SCOPE: CPT

## 2022-05-28 PROCEDURE — 70553 MRI BRAIN STEM W/O & W/DYE: CPT

## 2022-05-28 PROCEDURE — 83735 ASSAY OF MAGNESIUM: CPT

## 2022-05-28 PROCEDURE — A9575 INJ GADOTERATE MEGLUMI 0.1ML: HCPCS | Performed by: RADIOLOGY

## 2022-05-28 PROCEDURE — 83880 ASSAY OF NATRIURETIC PEPTIDE: CPT

## 2022-05-28 RX ORDER — ACETAMINOPHEN 325 MG/1
650 TABLET ORAL
Status: DISCONTINUED | OUTPATIENT
Start: 2022-05-28 | End: 2022-05-29 | Stop reason: HOSPADM

## 2022-05-28 RX ORDER — ACETAMINOPHEN 650 MG/1
650 SUPPOSITORY RECTAL
Status: DISCONTINUED | OUTPATIENT
Start: 2022-05-28 | End: 2022-05-29 | Stop reason: HOSPADM

## 2022-05-28 RX ORDER — LOSARTAN POTASSIUM 50 MG/1
100 TABLET ORAL DAILY
Status: DISCONTINUED | OUTPATIENT
Start: 2022-05-29 | End: 2022-05-29 | Stop reason: HOSPADM

## 2022-05-28 RX ORDER — ASPIRIN 81 MG/1
81 TABLET ORAL DAILY
Status: DISCONTINUED | OUTPATIENT
Start: 2022-05-29 | End: 2022-05-28 | Stop reason: SDUPTHER

## 2022-05-28 RX ORDER — LOSARTAN POTASSIUM 100 MG/1
100 TABLET ORAL DAILY
COMMUNITY

## 2022-05-28 RX ORDER — GADOTERATE MEGLUMINE 376.9 MG/ML
20 INJECTION INTRAVENOUS
Status: COMPLETED | OUTPATIENT
Start: 2022-05-28 | End: 2022-05-28

## 2022-05-28 RX ORDER — GUAIFENESIN 100 MG/5ML
81 LIQUID (ML) ORAL DAILY
Status: DISCONTINUED | OUTPATIENT
Start: 2022-05-28 | End: 2022-05-29 | Stop reason: HOSPADM

## 2022-05-28 RX ORDER — HYDRALAZINE HYDROCHLORIDE 20 MG/ML
10 INJECTION INTRAMUSCULAR; INTRAVENOUS
Status: DISCONTINUED | OUTPATIENT
Start: 2022-05-28 | End: 2022-05-29 | Stop reason: HOSPADM

## 2022-05-28 RX ADMIN — GADOTERATE MEGLUMINE 20 ML: 376.9 INJECTION, SOLUTION INTRAVENOUS at 12:58

## 2022-05-28 NOTE — PROGRESS NOTES
TRANSFER - IN REPORT:    Verbal report received from Marvella Lombard, RN (name) on Liliya Storm  being received from ED (unit) for routine progression of care      Report consisted of patients Situation, Background, Assessment and   Recommendations(SBAR). Information from the following report(s) SBAR, Kardex, ED Summary, Intake/Output, Accordion and Recent Results was reviewed with the receiving nurse. Opportunity for questions and clarification was provided. Assessment completed upon patients arrival to unit and care assumed. 1930  Bedside and Verbal shift change report given to Lorean Dakins, RN (oncoming nurse) by Diane Wooten RN (offgoing nurse). Report included the following information SBAR, Kardex, Procedure Summary, Intake/Output, MAR, Accordion and Recent Results.

## 2022-05-28 NOTE — ED NOTES
Stroke Education provided to patient and the following topics were discussed    1. Patients personal risk factors for stroke are hypertension    2. Warning signs of Stroke:        * Sudden numbness or weakness of the face, arm or leg, especially on one side of          The body            * Sudden confusion, trouble speaking or understanding        * Sudden trouble seeing in one or both eyes        * Sudden trouble walking, dizziness, loss of balance or coordination        * Sudden severe headache with no known cause      3. Importance of activation Emergency Medical Services ( 9-1-1 ) immediately if experience any warning signs of stroke. 4. Be sure and schedule a follow-up appointment with your primary care doctor or any specialists as instructed. 5. You must take medicine every day to treat your risk factors for stroke. Be sure to take your medicines exactly as your doctor tells you: no more, no less. Know what your medicines are for , what they do. Anti-thrombotics /anticoagulants can help prevent strokes. You are taking the following medicine(s)  ASA 81 mg daily     6. Smoking and second-hand smoke greatly increase your risk of stroke, cardiovascular disease and death. Smoking history ended year 10/1/2021    7. Information provided was [facility] Stroke Education Binder    8. Documentation of teaching completed in Patient Education Activity and on Care Plan with teaching response noted?   yes

## 2022-05-28 NOTE — H&P
[address]  [phone]    Admission History and Physical      NAME:  Liliya Storm   :   1960   MRN:  [id number]     PCP:  Mike Johns MD     Date of service:  2022         Subjective:     CHIEF COMPLAINT: Uncontrolled blood pressure, headache, tunnel vision    HISTORY OF PRESENT ILLNESS:     Mr. Anuel Juarez is a 60 y.o.  male who is admitted with hypertensive urgency. Mr. Anuel Juarez with past medical history of hypertension presented to ER complaining of uncontrolled blood pressure, headache and tunnel vision. Patient had difficulty controlling his blood pressure and recently had tried different blood pressure medications. This morning woke up around 630 and felt headache which is throbbing and when he checked his blood pressure it remained high. Patient denies numbness or tingling. Evaluated by telemetry neurology and recommended TIA work-up. Also, recently he was evaluated by cardiologist and plan to have stress test next week and advised to have sleep study. Past Medical History:   Diagnosis Date    Hypertension         Past Surgical History:   Procedure Laterality Date    COLONOSCOPY N/A 2020    COLONOSCOPY performed by Artemio Porter MD at [facility]      lower back surgery       Social History     Tobacco Use    Smoking status: Former Smoker    Smokeless tobacco: Never Used   Substance Use Topics    Alcohol use: Yes     Alcohol/week: 6.0 - 7.0 standard drinks     Types: 6 - 7 Glasses of wine per week        Family History   Problem Relation Age of Onset    Stroke Mother     Cancer Father         No Known Allergies     Prior to Admission medications    Medication Sig Start Date End Date Taking? Authorizing Provider   lisinopriL (PRINIVIL, ZESTRIL) 40 mg tablet Take 40 mg by mouth daily.     Provider, Historical   aspirin delayed-release 81 mg tablet Take 81 mg by mouth daily. Provider, Historical   triamterene-hydroCHLOROthiazide (MAXZIDE) 75-50 mg per tablet Take 1 Tab by mouth daily. Provider, Historical         Review of Systems:  (bold if positive, if negative)    Gen:  fever,Eyes:  ENT:  CVS:  Pulm:  GI:  :  MS:  Skin:  Psych:  Endo:  Hem:  Renal:  Neuro:            Objective:      VITALS:    Vital signs reviewed; most recent are:    Visit Vitals  BP (!) 165/99   Pulse (!) 57   Temp (!) 100.8 °F (38.2 °C)   Resp 19   Ht 5' 11\" (1.803 m)   Wt 129.8 kg (286 lb 1.6 oz)   SpO2 96%   BMI 39.90 kg/m²     SpO2 Readings from Last 6 Encounters:   05/28/22 96%   05/27/22 96%   09/25/20 97%        No intake or output data in the 24 hours ending 05/28/22 1138         Exam:     Physical Exam:    Gen:  Well-developed, well-nourished, in no acute distress  HEENT:  Pink conjunctivae, PERRL, hearing intact to voice, moist mucous membranes  Neck:  Supple, without masses, thyroid non-tender  Resp:  No accessory muscle use, clear breath sounds without wheezes rales or rhonchi  Card:  No murmurs, normal S1, S2 without thrills, bruits or peripheral edema  Abd:  Soft, non-tender, non-distended, normoactive bowel sounds are present, no palpable organomegaly and no detectable hernias  Lymph:  No cervical or inguinal adenopathy  Musc:  No cyanosis or clubbing  Skin:  No rashes or ulcers, skin turgor is good  Neuro:  Cranial nerves are grossly intact, no focal motor weakness, follows commands appropriately  Psych:  Good insight, oriented to person, place and time, alert       Labs:    Recent Labs     05/28/22  0850   WBC 11.9*   HGB 13.7   HCT 40.8        Recent Labs     05/28/22  0850      K 3.6   *   CO2 24   *   BUN 17   CREA 1.25   CA 9.2   MG 2.3   ALB 3.4*   TBILI 0.7   ALT 58     Lab Results   Component Value Date/Time    Glucose (POC) 114 05/28/2022 08:32 AM     No results for input(s): PH, PCO2, PO2, HCO3, FIO2 in the last 72 hours.   No results for input(s): INR, INREXT in the last 72 hours. Telemetry reviewed:   normal sinus rhythm       Assessment/Plan:    1. Tunnel vision. Now resolved. Evaluated by teleneurology and recommended admission and TIA work-up. CT scan of the head is unremarkable. Will check MRI of the brain and consult neurology. His vision problem could be due to his uncontrolled hypertension. 2.  HTN (hypertension) (5/28/2022), uncontrolled. Recently had several changes of his blood pressure medication and currently he is on losartan 100 mg p.o. daily, will resume and may need to add some other blood pressure medications. 3.  Fever (5/28/2022)/ Leukocytosis (5/28/2022). Fever is low-grade. UA and chest x-ray are unremarkable. Patient has been vaccinated against COVID and boosted x2. Will check RVP    4. Obesity.   His cardiologist recommended to have sleep study      Previous medical records reviewed     Risk of deterioration: high      Total time spent with patient: 79 [address] [place] discussed with: Patient, Nursing Staff and >50% of time spent in counseling and coordination of care    Discussed:  Care Plan    Prophylaxis:  Lovenox    Probable Disposition:  Home w/Family           ___________________________________________________    Attending Physician: Chelsea Fitch MD

## 2022-05-28 NOTE — CONSULTS
Neurology Consult - Inpatient      Name:   Rajat Desir  MRN:    [id number]    Date of Admission:  5/28/2022    Date of Consultation:  05/28/22       HISTORY OF PRESENT ILLNESS:     This is a 60 y.o. male with  has a past medical history of Hypertension. He has no past medical history of Adverse effect of anesthesia, Difficult intubation, Malignant hyperthermia due to anesthesia, Nausea & vomiting, or Pseudocholinesterase deficiency. .    Neurology is asked to see the patient for Vision changes/ tunnel vision    Patient reports that his blood pressure has been hard to control recently, has tried/ changed 4 different HTN meds, and recently established care with Cardiologist. Abimbola Leiva he's been having headaches since his BP has been high (shows me BP readings on his phone (180s/ 100-110)). Says he came in this AM due to having new symptom of bilateral peripheral vision seeming blurred. Arrival BP was 205/ 102, gradually coming down in ER with prn IV HTN meds.      Brain MRI done today is normal study  Pt reports vision change resolved before MRI was done  Reports he felt some palpitations this AM, never had before      Complete Review of Systems: reviewed on admission H&P    ====================================     No Known Allergies    Current Facility-Administered Medications   Medication Dose Route Frequency Provider Last Rate Last Admin    acetaminophen (TYLENOL) tablet 650 mg  650 mg Oral Q4H PRN Geronimo Morales MD        Or    acetaminophen (TYLENOL) solution 650 mg  650 mg Per NG tube Q4H PRN Geronimo Morales MD        Or    acetaminophen (TYLENOL) suppository 650 mg  650 mg Rectal Q4H PRN Geronimo Morales MD        aspirin chewable tablet 81 mg  81 mg Oral DAILY Geronimo Morales MD        [START ON 5/29/2022] losartan (COZAAR) tablet 100 mg  100 mg Oral DAILY Geronimo Morales MD        hydrALAZINE (APRESOLINE) 20 mg/mL injection 10 mg  10 mg IntraVENous Q6H PRN Niall Schwarz MD         Current Outpatient Medications   Medication Sig Dispense Refill    lisinopriL (PRINIVIL, ZESTRIL) 40 mg tablet Take 40 mg by mouth daily.  aspirin delayed-release 81 mg tablet Take 81 mg by mouth daily.  triamterene-hydroCHLOROthiazide (MAXZIDE) 75-50 mg per tablet Take 1 Tab by mouth daily. PSHx  has a past surgical history that includes hx orthopaedic and colonoscopy (N/A, 9/25/2020). SocHx  reports that he has quit smoking. He has never used smokeless tobacco. He reports current alcohol use of about 6.0 - 7.0 standard drinks of alcohol per week. He reports that he does not use drugs. FHx family history includes Cancer in his father; Stroke in his mother. PHYSICAL EXAM    Patient Vitals for the past 4 hrs:   Pulse Resp BP SpO2   05/28/22 1200 (!) 53 18  97 %   05/28/22 1145 (!) 54 16 (!) 144/83 96 %   05/28/22 1130 60 20 (!) 166/81 98 %   05/28/22 1115 65 14 (!) 151/136 97 %   05/28/22 1100 (!) 54 19 (!) 160/90 95 %   05/28/22 1045 (!) 52 17 (!) 180/92 95 %   05/28/22 1030 (!) 57 19 (!) 165/99 96 %   05/28/22 1015 (!) 53 16 (!) 169/86 97 %         General: Head: atraumatic. Eyes: conjunctiva clear. Neck: supple. Lungs: not examined. CV: not examined. Extremities: no edema. Skin: No rashes    Neurologic Exam:  Speech/ Language: no aphasia, no dysarthria. Alertness: oriented  x3.  CNs: Smell: not tested. Visual Fields (II): full to confrontation. Pupils (II): not examined. Funduscopic: not examined. Extraocular movements (III, IV, VI): conjugate in all directions, no RESHMA. Ptosis (III, VII): none. Facial Sensation (V): intact LT bilateral.  Facial Movements (VII): symmetric at rest and on activation. Hearing (VIII): normal.  Soft palate elevation (IX): not examined. Shoulder shrug (XI): symmetric, strong.   Tongue protrusion (XII): midline    Motor:    R Upper/ lower extremity: 5/5  Left upper ext: 5/5  L lower extremity: 4+/5 hip flexion, 5/5 knee flexion, 5/5 dorsiflexion    Sensory: intact LT in all exts    Cerebellar: no resting or postural tremors  Deep Tendon Reflexes:     1+ biceps (symmetric)  3+ patellars (symmetric)  1+ achilles reflex (symmetric)   + bueno sign in both thumbs  No sustained clonus at either ankle    Plantar response: neutral bilateral.  Gait: not tested. Romberg: not tested      Labs/ Radiology     Labs:    CBC minimally elevated WBC 11.9  CMP mild elevated Glucose 11, mild reduce GFR 58  UA Trace LE, culture \"not indicated by UA result\"    Imaging:    MRI BRAIN W WO CONT    Result Date: 5/28/2022  1. Normal brain MRI. XR CHEST PORT    Result Date: 5/28/2022  No Acute Disease. CT CODE NEURO HEAD WO CONTRAST    Result Date: 5/28/2022  Normal study. No change. Assessment/ Plan       ICD-10-CM ICD-9-CM    1. TIA (transient ischemic attack)  G45.9 435.9    2. Hypertension, unspecified type  I10 401.9    3. Abnormal vision  H53.9 368.9 DUPLEX CAROTID BILATERAL      DUPLEX CAROTID BILATERAL   4. Hypertensive urgency  I16.0 401.9 DUPLEX CAROTID BILATERAL      DUPLEX CAROTID BILATERAL     Hypertensive urgency causing transient bilateral peripheral visual impairment/ tunnel vision    Brain MRI appears normal, not tia/ not stroke, no PRES, no underlying abnormal brain masses    Continue working towards getting BP under control  Added Carotid Dopplers to ensure no significant carotid stenosis    Hyperreflexia (patellars and + Bueno sign bilateral): discussed findings with patient. He appeared nervous and asked him if he felt anxious, he said very. Asked him if he's been having any extremity weakness of arms or legs, he denies either. Says he's an avid walker and walks 3 miles a day. D/w him that the hyperflexia is could be anxiety-related, but we can check MRI C-spine to evaluate for any spinal cord stenosis that could be the cause of exam findings. He didn't want to proceed with that as he has not been having any extremity weakness.        Will follow up tomorrow      Thank you for asking the Neurology Service to evaluate Joe Lewis.      Signed By: Marta Tucker MD     May 28, 2022

## 2022-05-28 NOTE — ED PROVIDER NOTES
Patient is a 54-year-old male presented to ED with concern for hypertension and tunnel vision that happened acutely after discharge this morning. Level One code stroke called. Patient taken to CT scanner. Discussed with teleneurology includes a differential of hypertensive emergency/urgency versus TIA. Both work-up will be required. The history is provided by the patient and medical records. Hypertension   This is a chronic problem. The current episode started more than 1 week ago. The problem has been rapidly worsening. Associated symptoms include anxiety, malaise/fatigue and blurred vision. Pertinent negatives include no chest pain. There are no associated agents to hypertension. Risk factors include obesity, hypertension and male gender. Past Medical History:   Diagnosis Date    Hypertension        Past Surgical History:   Procedure Laterality Date    COLONOSCOPY N/A 9/25/2020    COLONOSCOPY performed by Jg Alfaro MD at [address] [place] [address] ORTHOPAEDIC      lower back surgery         Family History:   Problem Relation Age of Onset    Stroke Mother     Cancer Father        Social History     Socioeconomic History    Marital status:      Spouse name: Not on file    Number of children: Not on file    Years of education: Not on file    Highest education level: Not on file   Occupational History    Not on file   Tobacco Use    Smoking status: Former Smoker    Smokeless tobacco: Never Used   Substance and Sexual Activity    Alcohol use:  Yes     Alcohol/week: 6.0 - 7.0 standard drinks     Types: 6 - 7 Glasses of wine per week    Drug use: Never    Sexual activity: Not on file   Other Topics Concern    Not on file   Social History Narrative    Not on file     Social Determinants of Health     Financial Resource Strain:     Difficulty of Paying Living Expenses: Not on file   Food Insecurity:     Worried About Running Out of Food in the Last Year: Not on file    Adam of Food in the Last Year: Not on file   Transportation Needs:     Lack of Transportation (Medical): Not on file    Lack of Transportation (Non-Medical): Not on file   Physical Activity:     Days of Exercise per Week: Not on file    Minutes of Exercise per Session: Not on file   Stress:     Feeling of Stress : Not on file   Social Connections:     Frequency of Communication with Friends and Family: Not on file    Frequency of Social Gatherings with Friends and Family: Not on file    Attends Hindu Services: Not on file    Active Member of [address] [place] [address] [employer] or Organizations: Not on file    Attends Club or Organization Meetings: Not on file    Marital Status: Not on file   Intimate Partner Violence:     Fear of Current or Ex-Partner: Not on file    Emotionally Abused: Not on file    Physically Abused: Not on file    Sexually Abused: Not on file   Housing Stability:     Unable to Pay for Housing in the Last Year: Not on file    Number of [place] in the Last Year: Not on file    Unstable Housing in the Last Year: Not on file         ALLERGIES: Patient has no known allergies. Review of Systems   Constitutional: Positive for activity change, appetite change, fatigue and malaise/fatigue. HENT: Negative. Eyes: Positive for blurred vision and visual disturbance. Respiratory: Negative. Cardiovascular: Negative. Negative for chest pain. Gastrointestinal: Negative. Endocrine: Negative. Genitourinary: Negative. Musculoskeletal: Negative. Skin: Negative. Allergic/Immunologic: Negative. Neurological: Negative. Hematological: Negative. Psychiatric/Behavioral: Negative. Vitals:    05/28/22 0832   Pulse: 87            Physical Exam  Vitals and nursing note reviewed. Constitutional:       General: He is not in acute distress. Appearance: Normal appearance. He is obese. HENT:      Head: Normocephalic and atraumatic.       Right Ear: External ear normal.      Left Ear: External ear normal.      Nose: Nose normal.   Eyes:      Extraocular Movements: Extraocular movements intact. Conjunctiva/sclera: Conjunctivae normal.   Cardiovascular:      Rate and Rhythm: Normal rate. Pulses: Normal pulses. Radial pulses are 2+ on the right side and 2+ on the left side. Heart sounds: Normal heart sounds. Pulmonary:      Effort: Pulmonary effort is normal.      Breath sounds: Normal breath sounds. Chest:      Chest wall: No deformity or tenderness. Abdominal:      General: Abdomen is flat. There is no distension. Tenderness: There is no abdominal tenderness. Musculoskeletal:         General: No deformity or signs of injury. Normal range of motion. Cervical back: Normal range of motion and neck supple. No tenderness. Skin:     General: Skin is warm and dry. Capillary Refill: Capillary refill takes less than 2 seconds. Neurological:      General: No focal deficit present. Mental Status: He is alert and oriented to person, place, and time. Psychiatric:         Attention and Perception: Attention normal.         Mood and Affect: Mood normal.         Behavior: Behavior normal.          [facility]  ED Course as of 05/28/22 1422   Sat May 28, 2022   0851 EKG interpretation:   Rhythm: normal sinus rhythm; and regular . Rate (approx.): 69; Axis: normal; Intervals: normal ; ST/T wave: non-specific changes; EKG documented and interpreted by Bobby Carrasco. Rolland Gosselin, MD, Emergency Medicine.   [AL]   [address] [facility] htn emergency [AL]      ED Course User Index  [AL] Dylan Stevenson MD     LABORATORY RESULTS:  Labs Reviewed   CBC WITH AUTOMATED DIFF - Abnormal; Notable for the following components:       Result Value    WBC 11.9 (*)     ABS. NEUTROPHILS 8.6 (*)     ABS. IMM.  GRANS. 0.1 (*)     All other components within normal limits   METABOLIC PANEL, COMPREHENSIVE - Abnormal; Notable for the following components:    Chloride 109 (*)     Glucose 118 (*)     GFR est non-AA 58 (*)     Albumin 3.4 (*)     A-G Ratio 0.9 (*)     All other components within normal limits   NT-PRO BNP - Abnormal; Notable for the following components:    NT pro- (*)     All other components within normal limits   URINALYSIS W/ REFLEX CULTURE - Abnormal; Notable for the following components:    Leukocyte Esterase TRACE (*)     All other components within normal limits   TROPONIN-HIGH SENSITIVITY   MAGNESIUM   SAMPLES BEING HELD   GLUCOSE, POC       IMAGING RESULTS:  XR CHEST PORT   Final Result   No Acute Disease. CT CODE NEURO HEAD WO CONTRAST   Final Result   Normal study. No change. MEDICATIONS GIVEN:  Medications - No data to display    Differential diagnosis: Hypertensive emergency, hypertensive urgency, tunnel vision, TIA, CVA, ACS    ED physician interpretation of imaging: CT head without acute bleeds  ED physician interpretation of EKG: No STEMI. See my interpretation EKG and ED course above. ED physician interpretation of laboratory results: Lab work without critical values require emergency medical invention. However patient's BNP is elevated 465, CHF diagnosis possible    MDM: Patient is a 65-year-old male presented to ED with hypertension and vision changes requiring a level 1 code stroke activation, telemetry neuro consultation and hospital admission for TIA versus hypertensive urgency. Patient's vision changes resolved. Blood pressure stable in the ED requiring no IV blood pressure medication at this time. TIA work-up recommended by telemetry neuro    DISPOSITION: Admitted    Perfect Serve Consult for Admission  10:32 AM    ED Room Number: ER04/04  Patient Name and age:  Hali York 60 y.o.  male  Working Diagnosis:   1. TIA (transient ischemic attack)    2.  Hypertension, unspecified type        COVID-19 Suspicion:  no  Sepsis present:  no  Reassessment needed: no  Code Status:  Full Code  Readmission: no  Isolation Requirements:  no  Recommended Level of Care:  med/surg  Department:  [facility] ED - [phone]    Other: Patient returned to the ED after visit recently with hypertension and concern for tunnel vision that resolved in the ED. On arrival Level One code stroke was called. Patient will require TIA work-up as well as hypertension control. Patient takes 81 mg aspirin. Currently on losartan 40 mg but has tried other medications. Hali Riley.  Batool Richardson MD      Procedures

## 2022-05-28 NOTE — ED NOTES
TRANSFER - OUT REPORT:    Verbal report given to Reshma Hassan RN (name) on Vaishali Herring  being transferred to [facility] 3rd floor (unit) for routine progression of care       Report consisted of patients Situation, Background, Assessment and   Recommendations(SBAR). Information from the following report(s) SBAR, Kardex and ED Summary was reviewed with the receiving nurse. Lines:   Peripheral IV 05/28/22 Left Antecubital (Active)        Opportunity for questions and clarification was provided.       Patient transported with:   [employer]

## 2022-05-28 NOTE — ED TRIAGE NOTES
Code S Level 1 called. Pt arrives wih C/O HTN and SOB. Pt states he has not been able to sleep all night and noted \"tunnel like\" vision that started at 630 this morning and continued. Pt visibly shakey and anxious in triage.

## 2022-05-29 VITALS
RESPIRATION RATE: 19 BRPM | HEIGHT: 71 IN | OXYGEN SATURATION: 96 % | DIASTOLIC BLOOD PRESSURE: 85 MMHG | TEMPERATURE: 97.5 F | WEIGHT: 286.1 LBS | SYSTOLIC BLOOD PRESSURE: 150 MMHG | BODY MASS INDEX: 40.05 KG/M2 | HEART RATE: 59 BPM

## 2022-05-29 PROBLEM — R50.9 FEVER: Status: RESOLVED | Noted: 2022-05-28 | Resolved: 2022-05-29

## 2022-05-29 PROBLEM — D72.829 LEUKOCYTOSIS: Status: RESOLVED | Noted: 2022-05-28 | Resolved: 2022-05-29

## 2022-05-29 PROBLEM — H53.9 ABNORMAL VISION: Status: RESOLVED | Noted: 2022-05-28 | Resolved: 2022-05-29

## 2022-05-29 LAB
ANION GAP SERPL CALC-SCNC: 7 MMOL/L (ref 5–15)
ATRIAL RATE: 69 BPM
BASOPHILS # BLD: 0 K/UL (ref 0–0.1)
BASOPHILS NFR BLD: 0 % (ref 0–1)
BUN SERPL-MCNC: 16 MG/DL (ref 6–20)
BUN/CREAT SERPL: 15 (ref 12–20)
CALCIUM SERPL-MCNC: 9 MG/DL (ref 8.5–10.1)
CALCULATED P AXIS, ECG09: 48 DEGREES
CALCULATED R AXIS, ECG10: 73 DEGREES
CALCULATED T AXIS, ECG11: 24 DEGREES
CHLORIDE SERPL-SCNC: 109 MMOL/L (ref 97–108)
CHOLEST SERPL-MCNC: 172 MG/DL
CO2 SERPL-SCNC: 27 MMOL/L (ref 21–32)
CREAT SERPL-MCNC: 1.04 MG/DL (ref 0.7–1.3)
DIAGNOSIS, 93000: NORMAL
DIFFERENTIAL METHOD BLD: ABNORMAL
EOSINOPHIL # BLD: 0.3 K/UL (ref 0–0.4)
EOSINOPHIL NFR BLD: 3 % (ref 0–7)
ERYTHROCYTE [DISTWIDTH] IN BLOOD BY AUTOMATED COUNT: 12.9 % (ref 11.5–14.5)
EST. AVERAGE GLUCOSE BLD GHB EST-MCNC: 111 MG/DL
GLUCOSE SERPL-MCNC: 104 MG/DL (ref 65–100)
HBA1C MFR BLD: 5.5 % (ref 4–5.6)
HCT VFR BLD AUTO: 38.3 % (ref 36.6–50.3)
HDLC SERPL-MCNC: 54 MG/DL
HDLC SERPL: 3.2 {RATIO} (ref 0–5)
HGB BLD-MCNC: 12.8 G/DL (ref 12.1–17)
IMM GRANULOCYTES # BLD AUTO: 0 K/UL (ref 0–0.04)
IMM GRANULOCYTES NFR BLD AUTO: 0 % (ref 0–0.5)
LDLC SERPL CALC-MCNC: 91.4 MG/DL (ref 0–100)
LYMPHOCYTES # BLD: 1.7 K/UL (ref 0.8–3.5)
LYMPHOCYTES NFR BLD: 18 % (ref 12–49)
MCH RBC QN AUTO: 33.2 PG (ref 26–34)
MCHC RBC AUTO-ENTMCNC: 33.4 G/DL (ref 30–36.5)
MCV RBC AUTO: 99.2 FL (ref 80–99)
MONOCYTES # BLD: 0.8 K/UL (ref 0–1)
MONOCYTES NFR BLD: 8 % (ref 5–13)
NEUTS SEG # BLD: 6.9 K/UL (ref 1.8–8)
NEUTS SEG NFR BLD: 71 % (ref 32–75)
NRBC # BLD: 0 K/UL (ref 0–0.01)
NRBC BLD-RTO: 0 PER 100 WBC
P-R INTERVAL, ECG05: 168 MS
PLATELET # BLD AUTO: 210 K/UL (ref 150–400)
PMV BLD AUTO: 11.2 FL (ref 8.9–12.9)
POTASSIUM SERPL-SCNC: 3.5 MMOL/L (ref 3.5–5.1)
Q-T INTERVAL, ECG07: 428 MS
QRS DURATION, ECG06: 92 MS
QTC CALCULATION (BEZET), ECG08: 458 MS
RBC # BLD AUTO: 3.86 M/UL (ref 4.1–5.7)
SODIUM SERPL-SCNC: 143 MMOL/L (ref 136–145)
TRIGL SERPL-MCNC: 133 MG/DL (ref ?–150)
VENTRICULAR RATE, ECG03: 69 BPM
VLDLC SERPL CALC-MCNC: 26.6 MG/DL
WBC # BLD AUTO: 9.8 K/UL (ref 4.1–11.1)

## 2022-05-29 PROCEDURE — 96374 THER/PROPH/DIAG INJ IV PUSH: CPT

## 2022-05-29 PROCEDURE — 99213 OFFICE O/P EST LOW 20 MIN: CPT | Performed by: PSYCHIATRY & NEUROLOGY

## 2022-05-29 PROCEDURE — 83036 HEMOGLOBIN GLYCOSYLATED A1C: CPT

## 2022-05-29 PROCEDURE — 80048 BASIC METABOLIC PNL TOTAL CA: CPT

## 2022-05-29 PROCEDURE — 36415 COLL VENOUS BLD VENIPUNCTURE: CPT

## 2022-05-29 PROCEDURE — 85025 COMPLETE CBC W/AUTO DIFF WBC: CPT

## 2022-05-29 PROCEDURE — 74011250636 HC RX REV CODE- 250/636: Performed by: INTERNAL MEDICINE

## 2022-05-29 PROCEDURE — 80061 LIPID PANEL: CPT

## 2022-05-29 PROCEDURE — G0378 HOSPITAL OBSERVATION PER HR: HCPCS

## 2022-05-29 PROCEDURE — 74011250637 HC RX REV CODE- 250/637: Performed by: INTERNAL MEDICINE

## 2022-05-29 RX ORDER — HYDROCHLOROTHIAZIDE 25 MG/1
25 TABLET ORAL DAILY
Status: DISCONTINUED | OUTPATIENT
Start: 2022-05-29 | End: 2022-05-29 | Stop reason: HOSPADM

## 2022-05-29 RX ORDER — HYDROCHLOROTHIAZIDE 25 MG/1
25 TABLET ORAL DAILY
Qty: 30 TABLET | Refills: 1 | Status: SHIPPED | OUTPATIENT
Start: 2022-05-30

## 2022-05-29 RX ORDER — AMLODIPINE BESYLATE 5 MG/1
5 TABLET ORAL DAILY
Qty: 30 TABLET | Refills: 1 | Status: SHIPPED | OUTPATIENT
Start: 2022-05-29

## 2022-05-29 RX ADMIN — HYDROCHLOROTHIAZIDE 25 MG: 25 TABLET ORAL at 10:11

## 2022-05-29 RX ADMIN — HYDRALAZINE HYDROCHLORIDE 10 MG: 20 INJECTION INTRAMUSCULAR; INTRAVENOUS at 05:24

## 2022-05-29 RX ADMIN — ASPIRIN 81 MG: 81 TABLET, CHEWABLE ORAL at 08:02

## 2022-05-29 RX ADMIN — LOSARTAN POTASSIUM 100 MG: 50 TABLET, FILM COATED ORAL at 08:01

## 2022-05-29 RX ADMIN — ACETAMINOPHEN 650 MG: 325 TABLET ORAL at 08:02

## 2022-05-29 NOTE — PROGRESS NOTES
Neurology - Inpatient Progress Note     Name:   Ivis Newby  MRN:    [id number]  [address] [facility] Date:   5/28/2022    Follow up:   Transient bilateral visual changes, Hypertensive Urgency    Interval History     Carotid doppler PRELIM: no ICA stenosis on either side, vertebral flow is normal/ antegrade. Pt denies any new neurologic symptoms (has had chronic headache related to his difficult to control blood pressure). BPs looked good for most of day yesterday but peaked this AM (180s/ 190s)  Pt reports a similar pattern at home: very high in AMs, gradually lower as day progresses  Dr Chirag Bond has been adjusting pt's HTN meds    Resting in bed, awake,alert conversant  EOMI, VF normal bilaterally  5/5 strength in all exts  Intact LT in all exts        Brief ROS: As noted above         No Known Allergies    Current Facility-Administered Medications:     hydroCHLOROthiazide (HYDRODIURIL) tablet 25 mg, 25 mg, Oral, DAILY, Geronimo Morales MD, 25 mg at 05/29/22 1011    acetaminophen (TYLENOL) tablet 650 mg, 650 mg, Oral, Q4H PRN, 650 mg at 05/29/22 0802 **OR** acetaminophen (TYLENOL) solution 650 mg, 650 mg, Per NG tube, Q4H PRN **OR** acetaminophen (TYLENOL) suppository 650 mg, 650 mg, Rectal, Q4H PRN, Geronimo Morales MD    aspirin chewable tablet 81 mg, 81 mg, Oral, DAILY, Geronimo Morales MD, 81 mg at 05/29/22 0802    losartan (COZAAR) tablet 100 mg, 100 mg, Oral, DAILY, Geronimo Morales MD, 100 mg at 05/29/22 0801    hydrALAZINE (APRESOLINE) 20 mg/mL injection 10 mg, 10 mg, IntraVENous, Q6H PRN, Geronimo Morales MD, 10 mg at 05/29/22 7038      PMHx: has a past medical history of Hypertension. He has no past medical history of Adverse effect of anesthesia, Difficult intubation, Malignant hyperthermia due to anesthesia, Nausea & vomiting, or Pseudocholinesterase deficiency. PSHx: has a past surgical history that includes hx orthopaedic and colonoscopy (N/A, 9/25/2020).         Patient Vitals for the past 24 hrs:   Temp Pulse Resp BP SpO2   05/29/22 1105 97.5 °F (36.4 °C) 61 19 (!) 162/93 96 %   05/29/22 1011  (!) 59  (!) 178/84    05/29/22 0759 98.2 °F (36.8 °C) (!) 58 18 (!) 195/102 97 %   05/29/22 0719  (!) 55      05/29/22 0525 98.6 °F (37 °C) (!) 54 15 (!) 182/89 96 %   05/28/22 2342 97.6 °F (36.4 °C) (!) 56 16 (!) 161/89 97 %   05/28/22 2305  (!) 47      05/28/22 1931 98 °F (36.7 °C) (!) 49 15 (!) 162/81 95 %   05/28/22 1922 98.4 °F (36.9 °C) (!) 53 16 (!) 167/91 97 %   05/28/22 1828  (!) 50      05/28/22 1826  (!) 51  (!) 148/84    05/28/22 1654 99 °F (37.2 °C) (!) 56 16 (!) 156/87 95 %   05/28/22 1600 98.4 °F (36.9 °C) (!) 55 17 (!) 172/83 96 %   05/28/22 1500  (!) 57 19 (!) 164/94 98 %   05/28/22 1445  (!) 53 19 (!) 154/87 96 %   05/28/22 1400 98.5 °F (36.9 °C) (!) 55 22 (!) 160/78 94 %   05/28/22 1345  (!) 50 17 (!) 171/91 95 %   05/28/22 1200  (!) 53 18 (!) 169/85 97 %   05/28/22 1145  (!) 54 16 (!) 144/83 96 %         Impression / Plan       ICD-10-CM ICD-9-CM   1. TIA (transient ischemic attack)  G45.9 435.9   2. Hypertension, unspecified type  I10 401.9   3. Abnormal vision  H53.9 368.9   4.  Hypertensive urgency  I16.0 401.9     Transient bilateral peripheral visual difficulty in setting of Hypertensive Urgency    Brain MRI normal (done after resolution of vision changes)    Carotid dopplers w/o any significant stenosis    No additional inpatient neurology workup is planned; will sign off    Pt will need to follow up with his Cardiologist soon after d/c to continue addressing his difficult to control HTN          Signed By: Leigh Ann Fernandez MD     May 29, 2022

## 2022-05-29 NOTE — PROGRESS NOTES
Problem: Falls - Risk of  Goal: *Absence of Falls  Description: Document Eyad Soriano Fall Risk and appropriate interventions in the flowsheet.   Outcome: Progressing Towards Goal  Note: Fall Risk Interventions:                                Problem: Patient Education: Go to Patient Education Activity  Goal: Patient/Family Education  Outcome: Progressing Towards Goal

## 2022-05-29 NOTE — DISCHARGE SUMMARY
Hospitalist Discharge Summary     Patient ID:    Cecilio Diaz  [id number]  60 y.o.  1960    Admit date of service: 5/28/2022    Discharge date of service: 5/29/2022    Admission Diagnoses: Abnormal vision [H53.9]    Chronic Diagnoses:    Problem List as of 5/29/2022 Never Reviewed          Codes Class Noted - Resolved    HTN (hypertension) ICD-10-CM: I10  ICD-9-CM: 401.9  5/28/2022 - Present        * (Principal) RESOLVED: Abnormal vision ICD-10-CM: H53.9  ICD-9-CM: 368.9  5/28/2022 - 5/29/2022        RESOLVED: Fever ICD-10-CM: R50.9  ICD-9-CM: 780.60  5/28/2022 - 5/29/2022        RESOLVED: Leukocytosis ICD-10-CM: P77.819  ICD-9-CM: 288.60  5/28/2022 - 5/29/2022              Discharge Medications:   Current Discharge Medication List      START taking these medications    Details   hydroCHLOROthiazide (HYDRODIURIL) 25 mg tablet Take 1 Tablet by mouth daily. Qty: 30 Tablet, Refills: 1      amLODIPine (NORVASC) 5 mg tablet Take 1 Tablet by mouth daily. Qty: 30 Tablet, Refills: 1         CONTINUE these medications which have NOT CHANGED    Details   losartan (COZAAR) 100 mg tablet Take 100 mg by mouth daily. Indications: high blood pressure      aspirin delayed-release 81 mg tablet Take 81 mg by mouth daily. STOP taking these medications       lisinopriL (PRINIVIL, ZESTRIL) 40 mg tablet Comments:   Reason for Stopping:         triamterene-hydroCHLOROthiazide (MAXZIDE) 75-50 mg per tablet Comments:   Reason for Stopping: Follow up Care:    1. Other, MD Elieser in 1-2 weeks  2. Diet:  Cardiac Diet    Disposition:  Home. Advanced Directive:    Discharge Exam:  See today's note.     CONSULTATIONS: Neurology    Significant Diagnostic Studies:   Recent Labs     05/29/22  0028 05/28/22  0850   WBC 9.8 11.9*   HGB 12.8 13.7   HCT 38.3 40.8    245     Recent Labs     05/29/22  0028 05/28/22  0850 05/27/22  0304    142 144   K 3.5 3.6 4.1   * 109* 111*   CO2 27 24 26   BUN 16 17 18   CREA 1.04 1.25 1.05   * 118* 119*   CA 9.0 9.2 8.9   MG  --  2.3  --      Recent Labs     05/28/22  0850 05/27/22  0304   ALT 58 34   AP 63 54   TBILI 0.7 0.3   TP 7.1 6.5   ALB 3.4* 3.1*   GLOB 3.7 3.4     No results for input(s): INR, PTP, APTT, INREXT in the last 72 hours. No results for input(s): FE, TIBC, PSAT, FERR in the last 72 hours. No results for input(s): PH, PCO2, PO2 in the last 72 hours. No results for input(s): CPK, CKMB in the last 72 hours. No lab exists for component: TROPONINI  Lab Results   Component Value Date/Time    Glucose (POC) 114 05/28/2022 08:32 AM             HOSPITAL COURSE & TREATMENT RENDERED:   1.  Tunnel vision.  Now resolved.  this could be due to hypertensive urgency. Evaluated by teleneurology and recommended admission and TIA work-up.  CT scan of the head is unremarkable.  MRI of the brain and carotic doppler are normal. LDL is 94. A1C is 5.5. Evaluated by neurology.      2.  HTN (hypertension) (5/28/2022), uncontrolled.  Recently had several changes of his blood pressure medication and currently he is on losartan 100 mg p.o. daily, will resume and add HCTZ. If continue to be high will add amlodipine. .     3.  Fever (5/28/2022)/ Leukocytosis (5/28/2022).   Fever is low-grade.  UA and chest x-ray are unremarkable.  RVP is negative. Patient has been vaccinated against COVID and boosted x2.       4.  Obesity.  His cardiologist recommended to have sleep study. Would benefit from weight loss.      5.  Bradycardia. Asymptomatic. Avoid AVV blocking agents          Discharged in improved condition.     Spent 35 minutes    Signed:  Miller Vela MD  5/29/2022  1:09 PM

## 2022-05-29 NOTE — DISCHARGE INSTRUCTIONS
ACUTE DIAGNOSES:  Abnormal vision [H53.9]    CHRONIC MEDICAL DIAGNOSES:  Problem List as of 5/29/2022 Never Reviewed          Codes Class Noted - Resolved    HTN (hypertension) ICD-10-CM: I10  ICD-9-CM: 401.9  5/28/2022 - Present        * (Principal) RESOLVED: Abnormal vision ICD-10-CM: H53.9  ICD-9-CM: 368.9  5/28/2022 - 5/29/2022        RESOLVED: Fever ICD-10-CM: R50.9  ICD-9-CM: 780.60  5/28/2022 - 5/29/2022        RESOLVED: Leukocytosis ICD-10-CM: R29.986  ICD-9-CM: 288.60  5/28/2022 - 5/29/2022              DISCHARGE MEDICATIONS:          · It is important that you take the medication exactly as they are prescribed. · Keep your medication in the bottles provided by the pharmacist and keep a list of the medication names, dosages, and times to be taken in your wallet. · Do not take other medications without consulting your doctor. DIET:  Cardiac Diet    ACTIVITY: Activity as tolerated    ADDITIONAL INFORMATION: If you experience any of the following symptoms then please call your primary care physician or return to the emergency room if you cannot get hold of your doctor: Fever, chills, nausea, vomiting, diarrhea, change in mentation, falling, bleeding, shortness of breath. FOLLOW UP CARE:  Primary care physician. you are to call and set up an appointment to see them in 5 days. Information obtained by :  I understand that if any problems occur once I am at home I am to contact my physician. I understand and acknowledge receipt of the instructions indicated above.                                                                                                                                            Physician's or R.N.'s Signature                                                                  Date/Time                                                                                                                                              Patient or Representative Signature Date/Time

## 2022-05-29 NOTE — PROGRESS NOTES
[facility] [address]  [address], [address] [facility]  [phone]      Medical Progress Note      NAME: Malick Singh   :  1960  MRM:  [id number]    Date of service: 2022  8:00 AM       Assessment and Plan:   1. Tunnel vision. Now resolved. Evaluated by teleneurology and recommended admission and TIA work-up. CT scan of the head is unremarkable. MRI of the brain is normal. LDL is 94. A1C is 5.5. Evaluated by neurology.      2. HTN (hypertension) (2022), uncontrolled. Recently had several changes of his blood pressure medication and currently he is on losartan 100 mg p.o. daily, will resume and add HCTZ. If continue to be high will add amlodipine. .     3. Fever (2022)/ Leukocytosis (2022). Fever is low-grade. UA and chest x-ray are unremarkable. RVP is negative. Patient has been vaccinated against COVID and boosted x2.       4.  Obesity. His cardiologist recommended to have sleep study. Would benefit from weight loss. 5.  Bradycardia. Asymptomatic. Avoid AVV blocking agents              Subjective:     Chief Complaint[de-identified] Patient was seen and examined as a follow up for uncontrolled HTN. Chart was reviewed. BP is still high. ROS:  (bold if positive, if negative)    Tolerating PT  Tolerating Diet        Objective:     Last 24hrs VS reviewed since prior progress note.  Most recent are:    Visit Vitals  BP (!) 182/89 (BP 1 Location: Right upper arm, BP Patient Position: At rest)   Pulse (!) 55   Temp 98.6 °F (37 °C)   Resp 15   Ht 5' 11\" (1.803 m)   Wt 129.8 kg (286 lb 1.6 oz)   SpO2 96%   BMI 39.90 kg/m²     SpO2 Readings from Last 6 Encounters:   22 96%   22 96%   20 97%            Intake/Output Summary (Last 24 hours) at 2022 0800  Last data filed at 2022 2342  Gross per 24 hour   Intake    Output 0 ml   Net 0 ml        Physical Exam:    Gen:  Well-developed, well-nourished, in no acute distress  HEENT: Pink conjunctivae, PERRL, hearing intact to voice, moist mucous membranes  Neck:  Supple, without masses, thyroid non-tender  Resp:  No accessory muscle use, clear breath sounds without wheezes rales or rhonchi  Card:  No murmurs, normal S1, S2 without thrills, bruits or peripheral edema  Abd:  Soft, non-tender, non-distended, normoactive bowel sounds are present, no palpable organomegaly and no detectable hernias  Lymph:  No cervical or inguinal adenopathy  Musc:  No cyanosis or clubbing  Skin:  No rashes or ulcers, skin turgor is good  Neuro:  Cranial nerves are grossly intact, no focal motor weakness, follows commands appropriately  Psych:  Good insight, oriented to person, place and time, alert  __________________________________________________________________  Medications Reviewed: (see below)  Medications:     Current Facility-Administered Medications   Medication Dose Route Frequency    acetaminophen (TYLENOL) tablet 650 mg  650 mg Oral Q4H PRN    Or    acetaminophen (TYLENOL) solution 650 mg  650 mg Per NG tube Q4H PRN    Or    acetaminophen (TYLENOL) suppository 650 mg  650 mg Rectal Q4H PRN    aspirin chewable tablet 81 mg  81 mg Oral DAILY    losartan (COZAAR) tablet 100 mg  100 mg Oral DAILY    hydrALAZINE (APRESOLINE) 20 mg/mL injection 10 mg  10 mg IntraVENous Q6H PRN        Lab Data Reviewed: (see below)  Lab Review:     Recent Labs     05/29/22 0028 05/28/22  0850 05/27/22  0304   WBC 9.8 11.9* 10.6   HGB 12.8 13.7 13.1   HCT 38.3 40.8 38.4    245 235     Recent Labs     05/29/22 0028 05/28/22  0850 05/27/22  0304    142 144   K 3.5 3.6 4.1   * 109* 111*   CO2 27 24 26   * 118* 119*   BUN 16 17 18   CREA 1.04 1.25 1.05   CA 9.0 9.2 8.9   MG  --  2.3  --    ALB  --  3.4* 3.1*   TBILI  --  0.7 0.3   ALT  --  58 34     Lab Results   Component Value Date/Time    Glucose (POC) 114 05/28/2022 08:32 AM     No results for input(s): PH, PCO2, PO2, HCO3, FIO2 in the last 72 hours. No results for input(s): INR, INREXT in the last 72 hours. All Micro Results     Procedure Component Value Units Date/Time    RESPIRATORY VIRUS PANEL W/COVID-19, PCR [[id number]] Collected: 05/28/22 1148    Order Status: Completed Specimen: Nasopharyngeal Updated: 05/28/22 1448     Adenovirus Not detected        Coronavirus 229E Not detected        Coronavirus HKU1 Not detected        Coronavirus CVNL63 Not detected        Coronavirus OC43 Not detected        SARS-CoV-2, PCR Not detected        Metapneumovirus Not detected        Rhinovirus and Enterovirus Not detected        Influenza A Not detected        Influenza A, subtype H1 Not detected        Influenza A, subtype H3 Not detected        INFLUENZA A H1N1 PCR Not detected        Influenza B Not detected        Parainfluenza 1 Not detected        Parainfluenza 2 Not detected        Parainfluenza 3 Not detected        Parainfluenza virus 4 Not detected        RSV by PCR Not detected        B. parapertussis, PCR Not detected        Bordetella pertussis - PCR Not detected        Chlamydophila pneumoniae DNA, QL, PCR Not detected        Mycoplasma pneumoniae DNA, QL, PCR Not detected             I have reviewed notes of prior 24hr. Other pertinent lab: Total time spent with patient: 28 I personally reviewed chart, notes, data and current medications in the medical record. I have personally examined and treated the patient at bedside during this period.                  Care Plan discussed with: Patient, Nursing Staff and >50% of time spent in counseling and coordination of care    Discussed:  Care Plan    Prophylaxis:  Lovenox    Disposition:  Home w/Family           ___________________________________________________    Attending Physician: Go Curry MD

## 2022-05-29 NOTE — PROGRESS NOTES
Reason for Admission:  Abnormal fvision                     RUR Score:         N/A            Plan for utilizing home health:      none    PCP: First and Last name:  Hernandez [facility] Smoker     Name of Practice:    Are you a current patient: Yes/No: yes   Approximate date of last visit: within 2 weeks   Can you participate in a virtual visit with your PCP:  Yes                     Current Advanced Directive/Advance Care Plan: Full Code  Has an AD, not on file    Healthcare Decision Maker:   Click here to complete [address] including selection of the Healthcare Decision Maker Relationship (ie \"Primary\")                             Transition of Care Plan:                    Met with pt and his wife for d/c planning. Pt lives with his wife in a 1 story house with 2 entry steps. Pt stated he is independent with ADL's, drives and uses no DME. Medications are from [facility]. 1. Neuro following  2. Pt's wife to transport him home at d/c  3. CM following  4. Pt to f/u OP    Care Management Interventions  PCP Verified by CM: Yes  Mode of Transport at Discharge: Other (see comment)  Support Systems: Spouse/Significant Other  Confirm Follow Up Transport: Family  Discharge Location  Patient Expects to be Discharged to[de-identified] Home  ARCHANA Noland

## 2022-05-29 NOTE — PROGRESS NOTES
Bedside and Verbal shift change report given to Alistair (oncoming nurse) by Fredy Olmos (offgoing nurse). Report included the following information SBAR, Kardex, ED Summary, Procedure Summary, Intake/Output, MAR, Recent Results, Med Rec Status and Cardiac Rhythm Marielle Monk. Stroke Education provided to patient and the following topics were discussed    1. Patients personal risk factors for stroke are hypertension    2. Warning signs of Stroke:        * Sudden numbness or weakness of the face, arm or leg, especially on one side of          The body            * Sudden confusion, trouble speaking or understanding        * Sudden trouble seeing in one or both eyes        * Sudden trouble walking, dizziness, loss of balance or coordination        * Sudden severe headache with no known cause      3. Importance of activation Emergency Medical Services ( 9-1-1 ) immediately if experience any warning signs of stroke. 4. Be sure and schedule a follow-up appointment with your primary care doctor or any specialists as instructed. 5. You must take medicine every day to treat your risk factors for stroke. Be sure to take your medicines exactly as your doctor tells you: no more, no less. Know what your medicines are for , what they do. Anti-thrombotics /anticoagulants can help prevent strokes. You are taking the following medicine(s)       6.  Smoking and second-hand smoke greatly increase your risk of stroke, cardiovascular disease and death. Smoking history never    7. Information provided was [facility] Stroke Education Binder    8. Documentation of teaching completed in Patient Education Activity and on Care Plan with teaching response noted?   yes

## 2022-05-29 NOTE — PROGRESS NOTES
Bedside and Verbal shift change report given to Ivy Ragland RN (oncoming nurse) by Luciano Damico RN (offgoing nurse). Report included the following information SBAR, Kardex, Intake/Output, MAR, Accordion and Recent Results. This patient was assisted with Intentional Toileting every 2 hours during this shift as appropriate. Documentation of ambulation and output reflected on Flowsheet as appropriate. Purposeful hourly rounding was completed using AIDET and 5Ps. Outcomes of PHR documented as they occurred. Bed alarm in use as appropriate. Dual Suction and ambubag in place. Discharge instructions reviewed with patient and wife. Paperwork signed and placed on chart. endorsed to next nurse Henrietta

## 2022-05-30 LAB
LEFT CCA DIST DIAS: 14.2 CM/S
LEFT CCA DIST SYS: 63.4 CM/S
LEFT CCA PROX DIAS: 24.8 CM/S
LEFT CCA PROX SYS: 86.4 CM/S
LEFT ECA DIAS: 19.4 CM/S
LEFT ECA SYS: 107 CM/S
LEFT ICA DIST DIAS: 19.5 CM/S
LEFT ICA DIST SYS: 66.2 CM/S
LEFT ICA MID DIAS: 17.1 CM/S
LEFT ICA MID SYS: 62.2 CM/S
LEFT ICA PROX DIAS: 17.9 CM/S
LEFT ICA PROX SYS: 76.9 CM/S
LEFT ICA/CCA SYS: 1.2 NO UNITS
LEFT VERTEBRAL DIAS: 17.1 CM/S
LEFT VERTEBRAL SYS: 58.8 CM/S
RIGHT CCA DIST DIAS: 15.8 CM/S
RIGHT CCA DIST SYS: 61.3 CM/S
RIGHT CCA PROX DIAS: 17.5 CM/S
RIGHT CCA PROX SYS: 72.4 CM/S
RIGHT ECA DIAS: 13.9 CM/S
RIGHT ECA SYS: 107 CM/S
RIGHT ICA DIST DIAS: 29.2 CM/S
RIGHT ICA DIST SYS: 73.2 CM/S
RIGHT ICA MID DIAS: 20.7 CM/S
RIGHT ICA MID SYS: 55.6 CM/S
RIGHT ICA PROX DIAS: 14.2 CM/S
RIGHT ICA PROX SYS: 48.3 CM/S
RIGHT ICA/CCA SYS: 1.2 NO UNITS
RIGHT VERTEBRAL DIAS: 15.6 CM/S
RIGHT VERTEBRAL SYS: 43.5 CM/S
VAS LEFT SUBCLAVIAN PROX EDV: 0 CM/S
VAS LEFT SUBCLAVIAN PROX PSV: 103.9 CM/S
VAS RIGHT SUBCLAVIAN PROX EDV: 0 CM/S
VAS RIGHT SUBCLAVIAN PROX PSV: 102.6 CM/S

## (undated) DEVICE — SOLIDIFIER MEDC 1200ML -- CONVERT TO 356117

## (undated) DEVICE — Device

## (undated) DEVICE — BAG SPEC BIOHZRD 10 X 10 IN --

## (undated) DEVICE — SET ADMIN 16ML TBNG L100IN 2 Y INJ SITE IV PIGGY BK DISP

## (undated) DEVICE — SNARE ENDOSCP M L240CM W27MM SHTH DIA2.4MM CHN 2.8MM OVL

## (undated) DEVICE — CATH IV AUTOGRD BC BLU 22GA 25 -- INSYTE

## (undated) DEVICE — POLYP TRAP: Brand: TRAPEASE®

## (undated) DEVICE — CONTAINER SPEC 20 ML LID NEUT BUFF FORMALIN 10 % POLYPR STS

## (undated) DEVICE — KIT COLON W/ 1.1OZ LUB AND 2 END

## (undated) DEVICE — ELECTRODE,RADIOTRANSLUCENT,FOAM,3PK: Brand: MEDLINE

## (undated) DEVICE — 1200 GUARD II KIT W/5MM TUBE W/O VAC TUBE: Brand: GUARDIAN

## (undated) DEVICE — ADULT SPO2 SENSOR: Brand: NELLCOR

## (undated) DEVICE — BAG BELONG PT PERS CLEAR HANDL

## (undated) DEVICE — CUFF BP 2 TUBE SFTCUF NEO 4